# Patient Record
Sex: FEMALE | Race: WHITE | Employment: OTHER | RURAL
[De-identification: names, ages, dates, MRNs, and addresses within clinical notes are randomized per-mention and may not be internally consistent; named-entity substitution may affect disease eponyms.]

---

## 2022-05-31 ENCOUNTER — HOSPITAL ENCOUNTER (OUTPATIENT)
Facility: HOSPITAL | Age: 67
Discharge: HOME | End: 2022-05-31
Payer: MEDICARE

## 2022-05-31 PROCEDURE — U0003 INFECTIOUS AGENT DETECTION BY NUCLEIC ACID (DNA OR RNA); SEVERE ACUTE RESPIRATORY SYNDROME CORONAVIRUS 2 (SARS-COV-2) (CORONAVIRUS DISEASE [COVID-19]), AMPLIFIED PROBE TECHNIQUE, MAKING USE OF HIGH THROUGHPUT TECHNOLOGIES AS DESCRIBED BY CMS-2020-01-R: HCPCS

## 2022-05-31 PROCEDURE — U0005 INFEC AGEN DETEC AMPLI PROBE: HCPCS

## 2022-06-01 LAB — SARS-COV-2: NOT DETECTED

## 2022-08-18 ENCOUNTER — OFFICE VISIT (OUTPATIENT)
Dept: PRIMARY CARE CLINIC | Age: 67
End: 2022-08-18
Payer: MEDICARE

## 2022-08-18 VITALS
TEMPERATURE: 98.2 F | HEART RATE: 68 BPM | OXYGEN SATURATION: 98 % | WEIGHT: 127 LBS | BODY MASS INDEX: 18.81 KG/M2 | SYSTOLIC BLOOD PRESSURE: 100 MMHG | DIASTOLIC BLOOD PRESSURE: 52 MMHG | HEIGHT: 69 IN

## 2022-08-18 DIAGNOSIS — E03.9 HYPOTHYROIDISM, UNSPECIFIED TYPE: ICD-10-CM

## 2022-08-18 DIAGNOSIS — Z78.0 POST-MENOPAUSAL: ICD-10-CM

## 2022-08-18 DIAGNOSIS — I73.9 PVD (PERIPHERAL VASCULAR DISEASE) (HCC): ICD-10-CM

## 2022-08-18 DIAGNOSIS — G47.00 INSOMNIA, UNSPECIFIED TYPE: ICD-10-CM

## 2022-08-18 DIAGNOSIS — Z87.891 PERSONAL HISTORY OF NICOTINE DEPENDENCE: ICD-10-CM

## 2022-08-18 DIAGNOSIS — E78.5 HYPERLIPIDEMIA, UNSPECIFIED HYPERLIPIDEMIA TYPE: ICD-10-CM

## 2022-08-18 DIAGNOSIS — C02.9 SQUAMOUS CELL CANCER OF TONGUE (HCC): ICD-10-CM

## 2022-08-18 DIAGNOSIS — Z12.31 ENCOUNTER FOR SCREENING MAMMOGRAM FOR BREAST CANCER: ICD-10-CM

## 2022-08-18 DIAGNOSIS — I10 BENIGN HYPERTENSION: Primary | ICD-10-CM

## 2022-08-18 PROBLEM — Z98.890 H/O THYROIDECTOMY: Status: ACTIVE | Noted: 2021-06-04

## 2022-08-18 PROBLEM — Z90.89 H/O THYROIDECTOMY: Status: ACTIVE | Noted: 2021-06-04

## 2022-08-18 PROBLEM — E89.0 H/O THYROIDECTOMY: Status: ACTIVE | Noted: 2021-06-04

## 2022-08-18 PROBLEM — E07.9 THYROID DISORDER: Status: ACTIVE | Noted: 2021-06-04

## 2022-08-18 PROCEDURE — 4004F PT TOBACCO SCREEN RCVD TLK: CPT | Performed by: INTERNAL MEDICINE

## 2022-08-18 PROCEDURE — G8400 PT W/DXA NO RESULTS DOC: HCPCS | Performed by: INTERNAL MEDICINE

## 2022-08-18 PROCEDURE — G8420 CALC BMI NORM PARAMETERS: HCPCS | Performed by: INTERNAL MEDICINE

## 2022-08-18 PROCEDURE — 1123F ACP DISCUSS/DSCN MKR DOCD: CPT | Performed by: INTERNAL MEDICINE

## 2022-08-18 PROCEDURE — 99406 BEHAV CHNG SMOKING 3-10 MIN: CPT | Performed by: INTERNAL MEDICINE

## 2022-08-18 PROCEDURE — 3017F COLORECTAL CA SCREEN DOC REV: CPT | Performed by: INTERNAL MEDICINE

## 2022-08-18 PROCEDURE — 1090F PRES/ABSN URINE INCON ASSESS: CPT | Performed by: INTERNAL MEDICINE

## 2022-08-18 PROCEDURE — 99204 OFFICE O/P NEW MOD 45 MIN: CPT | Performed by: INTERNAL MEDICINE

## 2022-08-18 PROCEDURE — G8427 DOCREV CUR MEDS BY ELIG CLIN: HCPCS | Performed by: INTERNAL MEDICINE

## 2022-08-18 RX ORDER — ALBUTEROL SULFATE 90 UG/1
2 AEROSOL, METERED RESPIRATORY (INHALATION) EVERY 6 HOURS PRN
Qty: 18 G | Refills: 1 | Status: SHIPPED | OUTPATIENT
Start: 2022-08-18

## 2022-08-18 RX ORDER — ATORVASTATIN CALCIUM 40 MG/1
TABLET, FILM COATED ORAL
COMMUNITY
End: 2022-08-18

## 2022-08-18 RX ORDER — HYDROXYZINE 50 MG/1
TABLET, FILM COATED ORAL NIGHTLY
COMMUNITY
End: 2022-08-18 | Stop reason: SDUPTHER

## 2022-08-18 RX ORDER — ATENOLOL 100 MG/1
TABLET ORAL DAILY
COMMUNITY
End: 2022-08-18 | Stop reason: SDUPTHER

## 2022-08-18 RX ORDER — HYDROXYZINE 50 MG/1
50 TABLET, FILM COATED ORAL NIGHTLY
Qty: 90 TABLET | Refills: 0 | Status: SHIPPED | OUTPATIENT
Start: 2022-08-18

## 2022-08-18 RX ORDER — LORATADINE 10 MG/1
10 TABLET ORAL EVERY MORNING
COMMUNITY
End: 2022-08-18

## 2022-08-18 RX ORDER — BUSPIRONE HYDROCHLORIDE 30 MG/1
5 TABLET ORAL 2 TIMES DAILY
COMMUNITY

## 2022-08-18 RX ORDER — ASPIRIN 81 MG/1
81 TABLET ORAL DAILY
COMMUNITY
End: 2022-08-18 | Stop reason: SDUPTHER

## 2022-08-18 RX ORDER — HYDROCHLOROTHIAZIDE 25 MG/1
25 TABLET ORAL DAILY
Qty: 30 TABLET | Refills: 2 | Status: SHIPPED | OUTPATIENT
Start: 2022-08-18

## 2022-08-18 RX ORDER — LORAZEPAM 0.5 MG/1
TABLET ORAL
COMMUNITY
End: 2022-08-18

## 2022-08-18 RX ORDER — MEGESTROL ACETATE 40 MG/ML
200 SUSPENSION ORAL DAILY
Qty: 240 ML | Refills: 0 | Status: SHIPPED | OUTPATIENT
Start: 2022-08-18 | End: 2022-10-04

## 2022-08-18 RX ORDER — PYRIDOXINE HCL (VITAMIN B6) 100 MG
100 TABLET ORAL DAILY
Qty: 90 TABLET | Refills: 1 | Status: SHIPPED | OUTPATIENT
Start: 2022-08-18

## 2022-08-18 RX ORDER — LEVOTHYROXINE SODIUM 0.05 MG/1
50 TABLET ORAL DAILY
Qty: 90 TABLET | Refills: 1 | Status: SHIPPED | OUTPATIENT
Start: 2022-08-18

## 2022-08-18 RX ORDER — ATENOLOL 100 MG/1
100 TABLET ORAL DAILY
Qty: 90 TABLET | Refills: 1 | Status: SHIPPED | OUTPATIENT
Start: 2022-08-18

## 2022-08-18 RX ORDER — ASPIRIN 81 MG/1
81 TABLET ORAL DAILY
Qty: 90 TABLET | Refills: 1 | Status: SHIPPED | OUTPATIENT
Start: 2022-08-18

## 2022-08-18 RX ORDER — GEMFIBROZIL 600 MG/1
600 TABLET, FILM COATED ORAL
COMMUNITY
End: 2022-08-18

## 2022-08-18 RX ORDER — ALBUTEROL SULFATE 90 UG/1
2 AEROSOL, METERED RESPIRATORY (INHALATION) EVERY 6 HOURS PRN
COMMUNITY
End: 2022-08-18 | Stop reason: SDUPTHER

## 2022-08-18 RX ORDER — PYRIDOXINE HCL (VITAMIN B6) 100 MG
100 TABLET ORAL DAILY
COMMUNITY
End: 2022-08-18 | Stop reason: SDUPTHER

## 2022-08-18 RX ORDER — ROSUVASTATIN CALCIUM 40 MG/1
40 TABLET, COATED ORAL
COMMUNITY
End: 2022-08-18 | Stop reason: SDUPTHER

## 2022-08-18 RX ORDER — DIPHENHYDRAMINE HCL 25 MG
CAPSULE ORAL EVERY 6 HOURS PRN
COMMUNITY
End: 2022-08-18

## 2022-08-18 RX ORDER — MEGESTROL ACETATE 40 MG/ML
SUSPENSION ORAL DAILY
COMMUNITY
End: 2022-08-18 | Stop reason: SDUPTHER

## 2022-08-18 RX ORDER — HYDROCHLOROTHIAZIDE 50 MG/1
TABLET ORAL DAILY
COMMUNITY
End: 2022-08-18

## 2022-08-18 RX ORDER — CLOTRIMAZOLE AND BETAMETHASONE DIPROPIONATE 10; .64 MG/G; MG/G
CREAM TOPICAL
Qty: 45 G | Refills: 1 | Status: SHIPPED | OUTPATIENT
Start: 2022-08-18

## 2022-08-18 RX ORDER — BUSPIRONE HYDROCHLORIDE 5 MG/1
5 TABLET ORAL PRN
COMMUNITY
End: 2022-08-18

## 2022-08-18 RX ORDER — ROSUVASTATIN CALCIUM 40 MG/1
40 TABLET, COATED ORAL DAILY
Qty: 90 TABLET | Refills: 1 | Status: SHIPPED | OUTPATIENT
Start: 2022-08-18

## 2022-08-18 RX ORDER — LEVOTHYROXINE SODIUM 0.05 MG/1
TABLET ORAL DAILY
COMMUNITY
End: 2022-08-18 | Stop reason: SDUPTHER

## 2022-08-18 ASSESSMENT — ENCOUNTER SYMPTOMS
EYE DISCHARGE: 0
SHORTNESS OF BREATH: 0
COUGH: 0
WHEEZING: 0
SORE THROAT: 0
BACK PAIN: 0
NAUSEA: 0
VOMITING: 0
ABDOMINAL PAIN: 0
SINUS PRESSURE: 0

## 2022-08-18 NOTE — PROGRESS NOTES
SUBJECTIVE:    Patient ID: India Olszewski is a 79 y. o.female. Chief Complaint   Patient presents with    Establish Care         HPI:  Patient is here to establish care. [place] She has recent history of cancer of the tongue. Along with recent intervention for PVD with stent placement. She also has history of HTN, hyperlipidemia, hypothyroidism and has complaints today regarding insomnia and decreased appetite. Patient has had hypertension for several years. She has been compliant with taking medications, without side effects from it. She has been following a low-sodium, is not active and never exercises. Weight is stable, compared to before. Her blood pressure is low at this time. Patient reported that she has some trouble falling and maintaining sleep. Sx for the past several months. Sx getting worse. She would like something to help her sleeping. Patient's medications, allergies, past medical, surgical, social and family histories were reviewed and updated as appropriate in electronic medical record.         Outpatient Medications Marked as Taking for the 8/18/22 encounter (Office Visit) with Duran Martins MD   Medication Sig Dispense Refill    aspirin 81 MG EC tablet Take 81 mg by mouth daily      atenolol (TENORMIN) 100 MG tablet daily      busPIRone (BUSPAR) 30 MG tablet Take 5 mg by mouth 2 times daily      gemfibrozil (LOPID) 600 MG tablet Take 600 mg by mouth 2 times daily (before meals)      hydroCHLOROthiazide (HYDRODIURIL) 50 MG tablet daily      hydrOXYzine HCl (ATARAX) 50 MG tablet Take by mouth nightly      levothyroxine (SYNTHROID) 50 MCG tablet daily      megestrol (MEGACE) 40 MG/ML suspension Take by mouth daily      pyridoxine (B-6) 100 MG tablet Take 100 mg by mouth daily      rosuvastatin (CRESTOR) 40 MG tablet Take 40 mg by mouth      albuterol sulfate HFA (VENTOLIN HFA) 108 (90 Base) MCG/ACT inhaler Inhale 2 puffs into the lungs every 6 hours as needed are moist.      Pharynx: Oropharynx is clear. Eyes:      Conjunctiva/sclera: Conjunctivae normal.      Pupils: Pupils are equal, round, and reactive to light. Neck:      Thyroid: No thyromegaly. Vascular: No JVD. Cardiovascular:      Rate and Rhythm: Normal rate and regular rhythm. Heart sounds: Normal heart sounds. Pulmonary:      Effort: Pulmonary effort is normal.      Breath sounds: Normal breath sounds. No wheezing or rales. Abdominal:      General: Bowel sounds are normal. There is no distension. Palpations: Abdomen is soft. Tenderness: There is no abdominal tenderness. Musculoskeletal:         General: No tenderness. Cervical back: Neck supple. No rigidity. No muscular tenderness. Right lower leg: No edema. Left lower leg: No edema. Skin:     Findings: No erythema or rash. Neurological:      General: No focal deficit present. Mental Status: She is alert and oriented to person, place, and time. Psychiatric:         Behavior: Behavior normal.         Judgment: Judgment normal.       No results found for: NA, K, CL, CO2, GLUCOSE, BUN, CREATININE, CALCIUM, PROT, LABALBU, BILITOT, ALT, AST    No results found for: LABA1C, LABMICR, LDLCALC      No results found for: WBC, NEUTROABS, HGB, HCT, MCV, PLT    No results found for: TSH      ASSESSMENT/PLAN:     1. Benign hypertension  BP is low today. I am going to decrease dose of HCTZ to 25mg daily. Will monitor her renal function every few months, have advised her to check blood pressure frequently and to keep a record of this. Will check labs in the next few days. Further recommendation based on test results. - Comprehensive Metabolic Panel; Future  - CBC with Auto Differential; Future  - Folate; Future  - Lipid Panel; Future  - Magnesium; Future  - TSH; Future  - Vitamin B12; Future    2. Hypothyroidism, unspecified type  I am going to check the TSH in few days. Further recommendation based on test results. (peripheral vascular disease) ([facility] [address]  S/P stent placement. Will proceed with referral to vascular for continuation of care and the potential need for additional intervention. Discussed the importance of increased activity level. Make sure lipid profile and blood pressure under good control. Long conversation with her regarding her ongoing smoking and need to quit as soon as possible. - External Referral To Vascular Surgery  - Comprehensive Metabolic Panel; Future  - CBC with Auto Differential; Future  - Folate; Future  - Lipid Panel; Future  - Magnesium; Future  - TSH; Future  - Vitamin B12; Future    9. Insomnia, unspecified type  I am going to start on Melatonin. Advised her on the direction and possible SE of meds. Advised her on sleep hygiene. Reassess every few months. Orders Placed This Encounter   Medications    clotrimazole-betamethasone (LOTRISONE) 1-0.05 % cream     Sig: Apply topically 2 times daily. Dispense:  45 g     Refill:  1        ILeanna MA am scribing for and in the presence of Maryellen Xavier MD on this date of 08/18/22 at 10:45 AM    I, Dr. Maryellen Xavier, personally performed the services described in the documentation as scribed by Leanna Fontanez MA, in my presence and it is both accurate and complete.

## 2022-08-31 ENCOUNTER — HOSPITAL ENCOUNTER (OUTPATIENT)
Dept: MAMMOGRAPHY | Facility: HOSPITAL | Age: 67
Discharge: HOME | End: 2022-08-31
Payer: MEDICARE

## 2022-08-31 ENCOUNTER — HOSPITAL ENCOUNTER (OUTPATIENT)
Facility: HOSPITAL | Age: 67
Discharge: HOME | End: 2022-08-31
Payer: MEDICARE

## 2022-08-31 VITALS — BODY MASS INDEX: 18.75 KG/M2 | WEIGHT: 127 LBS

## 2022-08-31 DIAGNOSIS — C02.9 SQUAMOUS CELL CANCER OF TONGUE (HCC): ICD-10-CM

## 2022-08-31 DIAGNOSIS — Z12.31 ENCOUNTER FOR SCREENING MAMMOGRAM FOR BREAST CANCER: ICD-10-CM

## 2022-08-31 DIAGNOSIS — E03.9 HYPOTHYROIDISM, UNSPECIFIED TYPE: ICD-10-CM

## 2022-08-31 DIAGNOSIS — E78.5 HYPERLIPIDEMIA, UNSPECIFIED HYPERLIPIDEMIA TYPE: ICD-10-CM

## 2022-08-31 DIAGNOSIS — I73.9 PVD (PERIPHERAL VASCULAR DISEASE) (HCC): ICD-10-CM

## 2022-08-31 DIAGNOSIS — I10 BENIGN HYPERTENSION: ICD-10-CM

## 2022-08-31 LAB
A/G RATIO: 2 (ref 0.8–2)
ALBUMIN SERPL-MCNC: 4.8 G/DL (ref 3.4–4.8)
ALP BLD-CCNC: 87 U/L (ref 25–100)
ALT SERPL-CCNC: 17 U/L (ref 4–36)
ANION GAP SERPL CALCULATED.3IONS-SCNC: 14 MMOL/L (ref 3–16)
AST SERPL-CCNC: 18 U/L (ref 8–33)
BASOPHILS ABSOLUTE: 0.1 K/UL (ref 0–0.1)
BASOPHILS RELATIVE PERCENT: 1.2 %
BILIRUB SERPL-MCNC: 0.4 MG/DL (ref 0.3–1.2)
BUN BLDV-MCNC: 20 MG/DL (ref 6–20)
CALCIUM SERPL-MCNC: 10.4 MG/DL (ref 8.5–10.5)
CHLORIDE BLD-SCNC: 106 MMOL/L (ref 98–107)
CHOLESTEROL, TOTAL: 149 MG/DL (ref 0–200)
CO2: 24 MMOL/L (ref 20–30)
CREAT SERPL-MCNC: 1 MG/DL (ref 0.4–1.2)
EOSINOPHILS ABSOLUTE: 0.4 K/UL (ref 0–0.4)
EOSINOPHILS RELATIVE PERCENT: 5.4 %
FOLATE: >20 NG/ML
GFR AFRICAN AMERICAN: >59
GFR NON-AFRICAN AMERICAN: 55
GLOBULIN: 2.4 G/DL
GLUCOSE BLD-MCNC: 87 MG/DL (ref 74–106)
HCT VFR BLD CALC: 40.3 % (ref 37–47)
HDLC SERPL-MCNC: 39 MG/DL (ref 40–60)
HEMOGLOBIN: 13.3 G/DL (ref 11.5–16.5)
IMMATURE GRANULOCYTES #: 0.1 K/UL
IMMATURE GRANULOCYTES %: 0.6 % (ref 0–5)
LDL CHOLESTEROL CALCULATED: 86 MG/DL
LYMPHOCYTES ABSOLUTE: 2.1 K/UL (ref 1.5–4)
LYMPHOCYTES RELATIVE PERCENT: 27.4 %
MAGNESIUM: 2 MG/DL (ref 1.7–2.4)
MCH RBC QN AUTO: 29 PG (ref 27–32)
MCHC RBC AUTO-ENTMCNC: 33 G/DL (ref 31–35)
MCV RBC AUTO: 87.8 FL (ref 80–100)
MONOCYTES ABSOLUTE: 0.8 K/UL (ref 0.2–0.8)
MONOCYTES RELATIVE PERCENT: 10.1 %
NEUTROPHILS ABSOLUTE: 4.3 K/UL (ref 2–7.5)
NEUTROPHILS RELATIVE PERCENT: 55.3 %
PDW BLD-RTO: 14.6 % (ref 11–16)
PLATELET # BLD: 286 K/UL (ref 150–400)
PMV BLD AUTO: 9.5 FL (ref 6–10)
POTASSIUM SERPL-SCNC: 4.3 MMOL/L (ref 3.4–5.1)
RBC # BLD: 4.59 M/UL (ref 3.8–5.8)
SODIUM BLD-SCNC: 144 MMOL/L (ref 136–145)
TOTAL PROTEIN: 7.2 G/DL (ref 6.4–8.3)
TRIGL SERPL-MCNC: 119 MG/DL (ref 0–249)
TSH SERPL DL<=0.05 MIU/L-ACNC: 5.24 UIU/ML (ref 0.27–4.2)
VITAMIN B-12: 716 PG/ML (ref 211–911)
VLDLC SERPL CALC-MCNC: 24 MG/DL
WBC # BLD: 7.8 K/UL (ref 4–11)

## 2022-08-31 PROCEDURE — 36415 COLL VENOUS BLD VENIPUNCTURE: CPT

## 2022-08-31 PROCEDURE — 80061 LIPID PANEL: CPT

## 2022-08-31 PROCEDURE — 80053 COMPREHEN METABOLIC PANEL: CPT

## 2022-08-31 PROCEDURE — 77063 BREAST TOMOSYNTHESIS BI: CPT

## 2022-08-31 PROCEDURE — 84443 ASSAY THYROID STIM HORMONE: CPT

## 2022-08-31 PROCEDURE — 82746 ASSAY OF FOLIC ACID SERUM: CPT

## 2022-08-31 PROCEDURE — 82607 VITAMIN B-12: CPT

## 2022-08-31 PROCEDURE — 85025 COMPLETE CBC W/AUTO DIFF WBC: CPT

## 2022-08-31 PROCEDURE — 83735 ASSAY OF MAGNESIUM: CPT

## 2022-09-19 ENCOUNTER — HOSPITAL ENCOUNTER (OUTPATIENT)
Dept: GENERAL RADIOLOGY | Facility: HOSPITAL | Age: 67
Discharge: HOME | End: 2022-09-19
Payer: MEDICARE

## 2022-09-19 DIAGNOSIS — Z78.0 POST-MENOPAUSAL: ICD-10-CM

## 2022-09-19 PROCEDURE — 77080 DXA BONE DENSITY AXIAL: CPT

## 2022-10-04 ENCOUNTER — OFFICE VISIT (OUTPATIENT)
Dept: PRIMARY CARE CLINIC | Age: 67
End: 2022-10-04
Payer: MEDICARE

## 2022-10-04 VITALS
BODY MASS INDEX: 19.05 KG/M2 | HEART RATE: 72 BPM | WEIGHT: 129 LBS | RESPIRATION RATE: 18 BRPM | OXYGEN SATURATION: 95 %

## 2022-10-04 DIAGNOSIS — I10 BENIGN HYPERTENSION: Primary | ICD-10-CM

## 2022-10-04 DIAGNOSIS — Z23 NEED FOR INFLUENZA VACCINATION: ICD-10-CM

## 2022-10-04 DIAGNOSIS — E03.9 HYPOTHYROIDISM, UNSPECIFIED TYPE: ICD-10-CM

## 2022-10-04 DIAGNOSIS — I73.9 PVD (PERIPHERAL VASCULAR DISEASE) (HCC): ICD-10-CM

## 2022-10-04 DIAGNOSIS — E78.5 HYPERLIPIDEMIA, UNSPECIFIED HYPERLIPIDEMIA TYPE: ICD-10-CM

## 2022-10-04 PROCEDURE — G0008 ADMIN INFLUENZA VIRUS VAC: HCPCS | Performed by: INTERNAL MEDICINE

## 2022-10-04 PROCEDURE — 1123F ACP DISCUSS/DSCN MKR DOCD: CPT | Performed by: INTERNAL MEDICINE

## 2022-10-04 PROCEDURE — 90694 VACC AIIV4 NO PRSRV 0.5ML IM: CPT | Performed by: INTERNAL MEDICINE

## 2022-10-04 PROCEDURE — 99213 OFFICE O/P EST LOW 20 MIN: CPT | Performed by: INTERNAL MEDICINE

## 2022-10-04 RX ORDER — ERGOCALCIFEROL 1.25 MG/1
50000 CAPSULE ORAL WEEKLY
Qty: 12 CAPSULE | Refills: 1 | Status: SHIPPED | OUTPATIENT
Start: 2022-10-04

## 2022-10-04 SDOH — ECONOMIC STABILITY: FOOD INSECURITY: WITHIN THE PAST 12 MONTHS, YOU WORRIED THAT YOUR FOOD WOULD RUN OUT BEFORE YOU GOT MONEY TO BUY MORE.: NEVER TRUE

## 2022-10-04 SDOH — ECONOMIC STABILITY: FOOD INSECURITY: WITHIN THE PAST 12 MONTHS, THE FOOD YOU BOUGHT JUST DIDN'T LAST AND YOU DIDN'T HAVE MONEY TO GET MORE.: NEVER TRUE

## 2022-10-04 ASSESSMENT — PATIENT HEALTH QUESTIONNAIRE - PHQ9
SUM OF ALL RESPONSES TO PHQ9 QUESTIONS 1 & 2: 0
SUM OF ALL RESPONSES TO PHQ QUESTIONS 1-9: 0
2. FEELING DOWN, DEPRESSED OR HOPELESS: 0
SUM OF ALL RESPONSES TO PHQ QUESTIONS 1-9: 0
1. LITTLE INTEREST OR PLEASURE IN DOING THINGS: 0

## 2022-10-04 ASSESSMENT — ENCOUNTER SYMPTOMS
NAUSEA: 0
EYE DISCHARGE: 0
VOMITING: 0
SORE THROAT: 0
COUGH: 0
WHEEZING: 0
SINUS PRESSURE: 0
BACK PAIN: 0
ABDOMINAL PAIN: 0
SHORTNESS OF BREATH: 0

## 2022-10-04 ASSESSMENT — SOCIAL DETERMINANTS OF HEALTH (SDOH): HOW HARD IS IT FOR YOU TO PAY FOR THE VERY BASICS LIKE FOOD, HOUSING, MEDICAL CARE, AND HEATING?: NOT HARD AT ALL

## 2022-10-04 NOTE — PROGRESS NOTES
SUBJECTIVE:    Patient ID: Iban Martínez is a 79 y. o.female. Chief Complaint   Patient presents with    Hypertension    Hypothyroidism    Insomnia         HPI:  Patient has had hypertension and hyperlipidemia for several years. She has been compliant with taking medications, without side effects from it. She has been following a low-sodium, is active and rarely exercises. Weight is up 2 pounds, compared to last visit. Patient  has had hypothyroidism for the past few years. She has been compliant with taking her medication without side effects. There are no symptoms of hypo or hyper thyroidism except for slight decrease in her energy. She has had blood work and is here today for follow up. Patient reported that she had some trouble falling and maintaining sleep. Sx for the past several months. Sx have improved. She is not taking anything for this. Patient's medications, allergies, past medical, surgical, social and family histories were reviewed and updated as appropriate in electronic medical record. Outpatient Medications Marked as Taking for the 10/4/22 encounter (Office Visit) with Kelly Marte MD   Medication Sig Dispense Refill    busPIRone (BUSPAR) 30 MG tablet Take 5 mg by mouth 2 times daily      clotrimazole-betamethasone (LOTRISONE) 1-0.05 % cream Apply topically 2 times daily.  45 g 1    hydroCHLOROthiazide (HYDRODIURIL) 25 MG tablet Take 1 tablet by mouth daily 30 tablet 2    aspirin 81 MG EC tablet Take 1 tablet by mouth daily 90 tablet 1    atenolol (TENORMIN) 100 MG tablet Take 1 tablet by mouth daily 90 tablet 1    hydrOXYzine HCl (ATARAX) 50 MG tablet Take 1 tablet by mouth nightly 90 tablet 0    levothyroxine (SYNTHROID) 50 MCG tablet Take 1 tablet by mouth daily 90 tablet 1    pyridoxine (B-6) 100 MG tablet Take 1 tablet by mouth daily 90 tablet 1    rosuvastatin (CRESTOR) 40 MG tablet Take 1 tablet by mouth daily 90 tablet 1    albuterol sulfate HFA (VENTOLIN HFA) 108 (90 Base) MCG/ACT inhaler Inhale 2 puffs into the lungs every 6 hours as needed for Wheezing 18 g 1        Review of Systems   Constitutional:  Negative for chills and fever. HENT:  Negative for congestion, sinus pressure and sore throat. Eyes:  Negative for discharge and visual disturbance. Respiratory:  Negative for cough, shortness of breath and wheezing. Cardiovascular:  Negative for chest pain and palpitations. Gastrointestinal:  Negative for abdominal pain, nausea and vomiting. Endocrine: Negative for cold intolerance and heat intolerance. Genitourinary:  Negative for dysuria, frequency and urgency. Musculoskeletal:  Negative for arthralgias and back pain. Skin:  Negative for rash and wound. Neurological:  Negative for syncope, numbness and headaches. Hematological: Negative. Psychiatric/Behavioral:  Negative for agitation and sleep disturbance. The patient is not nervous/anxious. Past Medical History:   Diagnosis Date    Cancer of tongue ([address]     Hyperthyroidism      Past Surgical History:   Procedure Laterality Date    VASCULAR SURGERY Bilateral      Family History   Problem Relation Age of Onset    Breast Cancer Mother       Social History     Tobacco Use   Smoking Status Every Day    Packs/day: 1.00    Years: 54.00    Pack years: 54.00    Types: Cigarettes   Smokeless Tobacco Never       OBJECTIVE:   Wt Readings from Last 3 Encounters:   10/04/22 129 lb (58.5 kg)   08/31/22 127 lb (57.6 kg)   08/18/22 127 lb (57.6 kg)     BP Readings from Last 3 Encounters:   08/18/22 (!) 100/52       Pulse 72   Resp 18   Wt 129 lb (58.5 kg)   LMP 07/01/2002 (Approximate)   SpO2 95%   BMI 19.05 kg/m²      Physical Exam  Vitals and nursing note reviewed. Constitutional:       Appearance: Normal appearance. She is well-developed. Comments: Using Cane   HENT:      Head: Normocephalic and atraumatic.       Right Ear: External ear normal.      Left Ear: External ear normal.      Nose: Nose normal.      Mouth/Throat:      Mouth: Mucous membranes are moist.      Pharynx: Oropharynx is clear. Eyes:      Conjunctiva/sclera: Conjunctivae normal.      Pupils: Pupils are equal, round, and reactive to light. Neck:      Thyroid: No thyromegaly. Vascular: No JVD. Cardiovascular:      Rate and Rhythm: Normal rate and regular rhythm. Heart sounds: Normal heart sounds. Pulmonary:      Effort: Pulmonary effort is normal.      Breath sounds: Normal breath sounds. No wheezing or rales. Abdominal:      General: Bowel sounds are normal. There is no distension. Palpations: Abdomen is soft. Tenderness: no abdominal tenderness   Musculoskeletal:         General: No tenderness. Cervical back: Neck supple. No rigidity. No muscular tenderness. Right lower leg: No edema. Left lower leg: No edema. Skin:     Findings: No erythema or rash. Neurological:      General: No focal deficit present. Mental Status: She is alert and oriented to person, place, and time.    Psychiatric:         Behavior: Behavior normal.         Judgment: Judgment normal.       Lab Results   Component Value Date/Time     08/31/2022 08:04 AM    K 4.3 08/31/2022 08:04 AM     08/31/2022 08:04 AM    CO2 24 08/31/2022 08:04 AM    GLUCOSE 87 08/31/2022 08:04 AM    BUN 20 08/31/2022 08:04 AM    CREATININE 1.0 08/31/2022 08:04 AM    CALCIUM 10.4 08/31/2022 08:04 AM    PROT 7.2 08/31/2022 08:04 AM    LABALBU 4.8 08/31/2022 08:04 AM    BILITOT 0.4 08/31/2022 08:04 AM    ALT 17 08/31/2022 08:04 AM    AST 18 08/31/2022 08:04 AM       LDL Calculated (mg/dL)   Date Value   08/31/2022 86         Lab Results   Component Value Date/Time    WBC 7.8 08/31/2022 08:04 AM    NEUTROABS 4.3 08/31/2022 08:04 AM    HGB 13.3 08/31/2022 08:04 AM    HCT 40.3 08/31/2022 08:04 AM    MCV 87.8 08/31/2022 08:04 AM     08/31/2022 08:04 AM       Lab Results   Component Value Date    TSH 5.24 (H) 08/31/2022         ASSESSMENT/PLAN:     1. Benign hypertension  BP is stable. I have advised her on low-sodium diet, exercise and weight control. I am going to continue current medication. Will monitor her renal function every few months, have advised her to check blood pressure frequently and to keep a record of this. - Comprehensive Metabolic Panel; Future  - TSH; Future    2. Hypothyroidism, unspecified type  Last TSH is borderline elevated. I am going to check the TSH every few months. I am going to continue the current dose of Levothyroxine. I have advised her on the importance of taking the medication on a daily basis. Lab Results   Component Value Date    TSH 5.24 (H) 08/31/2022     3. Hyperlipidemia, unspecified hyperlipidemia type  I have advised her on low-fat diet, exercise and weight control. I am going to continue on current medication. I have also advised her on the possible side effects from the medication. I will monitor her liver functions and lipid profile every few months. Lipid well controlled. Lab Results   Component Value Date    LDLCALC 86 08/31/2022     - Comprehensive Metabolic Panel; Future  - TSH; Future    4. Need for influenza vaccination  Vaccine was given per request/rec after she was informed about possible SE/reaction to it. - Influenza, FLUAD, (age 72 y+), IM, Preservative Free, 0.5 mL    5. PVD (peripheral vascular disease) (HCC)  Continue aspirin and statin. Make sure blood pressure under good control. Discussed the importance of increased activity level. Discussed the importance of smoking cessation. Patient qualifies for CT lung screen but we will try to obtain oncology records in regard to imaging testing. Patient also was informed to discuss with oncology on that part to see if CT lung screen is necessary. PET/CT from [facility] in April? ?     Bone Density in 2024      Orders Placed This Encounter   Medications    vitamin D (ERGOCALCIFEROL) 1.25 MG (24266 UT) CAPS capsule     Sig: Take 1 capsule by mouth once a week     Dispense:  12 capsule     Refill:  1        I, Shanelle Hamm MA am scribing for and in the presence of Talya Burden MD on this date of 10/04/22 at 8:59 AM    I, Dr. Talya Burden, personally performed the services described in the documentation as scribed by Shanelle Hamm MA, in my presence and it is both accurate and complete.

## 2022-10-04 NOTE — PROGRESS NOTES
Chief Complaint   Patient presents with    Hypertension    Hypothyroidism    Insomnia       Have you seen any other physician or provider since your last visit no    Have you had any other diagnostic tests since your last visit?  yes -     Have you changed or stopped any medications since your last visit? no

## 2022-10-18 ENCOUNTER — NURSE ONLY (OUTPATIENT)
Dept: PRIMARY CARE CLINIC | Age: 67
End: 2022-10-18
Payer: MEDICARE

## 2022-10-18 DIAGNOSIS — Z23 NEED FOR PNEUMOCOCCAL VACCINATION: Primary | ICD-10-CM

## 2022-10-18 PROCEDURE — 90677 PCV20 VACCINE IM: CPT | Performed by: INTERNAL MEDICINE

## 2022-10-18 PROCEDURE — G0009 ADMIN PNEUMOCOCCAL VACCINE: HCPCS | Performed by: INTERNAL MEDICINE

## 2022-10-27 ENCOUNTER — OFFICE VISIT (OUTPATIENT)
Dept: CARDIOLOGY | Facility: CLINIC | Age: 67
End: 2022-10-27

## 2022-10-27 ENCOUNTER — HOSPITAL ENCOUNTER (OUTPATIENT)
Facility: HOSPITAL | Age: 67
Discharge: HOME | End: 2022-10-27
Payer: MEDICARE

## 2022-10-27 VITALS
SYSTOLIC BLOOD PRESSURE: 142 MMHG | OXYGEN SATURATION: 98 % | WEIGHT: 132 LBS | HEIGHT: 69 IN | DIASTOLIC BLOOD PRESSURE: 64 MMHG | BODY MASS INDEX: 19.55 KG/M2 | HEART RATE: 70 BPM

## 2022-10-27 DIAGNOSIS — I44.7 LBBB (LEFT BUNDLE BRANCH BLOCK): ICD-10-CM

## 2022-10-27 DIAGNOSIS — R09.89 BILATERAL CAROTID BRUITS: ICD-10-CM

## 2022-10-27 DIAGNOSIS — I73.9 PVD (PERIPHERAL VASCULAR DISEASE) WITH CLAUDICATION: ICD-10-CM

## 2022-10-27 DIAGNOSIS — I70.0 ATHEROSCLEROSIS OF AORTA: ICD-10-CM

## 2022-10-27 DIAGNOSIS — R06.09 DYSPNEA ON EXERTION: Primary | ICD-10-CM

## 2022-10-27 PROCEDURE — 93000 ELECTROCARDIOGRAM COMPLETE: CPT | Performed by: INTERNAL MEDICINE

## 2022-10-27 PROCEDURE — 99204 OFFICE O/P NEW MOD 45 MIN: CPT | Performed by: INTERNAL MEDICINE

## 2022-10-27 PROCEDURE — 93005 ELECTROCARDIOGRAM TRACING: CPT

## 2022-10-27 RX ORDER — ROSUVASTATIN CALCIUM 40 MG/1
40 TABLET, COATED ORAL DAILY
COMMUNITY
Start: 2022-10-10

## 2022-10-27 RX ORDER — PYRIDOXINE HCL (VITAMIN B6) 100 MG
100 TABLET ORAL DAILY
COMMUNITY
Start: 2022-08-18

## 2022-10-27 RX ORDER — ERGOCALCIFEROL 1.25 MG/1
50000 CAPSULE ORAL WEEKLY
COMMUNITY

## 2022-10-27 RX ORDER — ATENOLOL 100 MG/1
100 TABLET ORAL DAILY
COMMUNITY
Start: 2022-10-10

## 2022-10-27 RX ORDER — CALCIUM POLYCARBOPHIL 625 MG
TABLET ORAL DAILY
COMMUNITY
End: 2022-10-27

## 2022-10-27 RX ORDER — HYDROXYZINE PAMOATE 50 MG/1
50 CAPSULE ORAL DAILY
COMMUNITY
Start: 2022-10-10

## 2022-10-27 RX ORDER — BUSPIRONE HYDROCHLORIDE 30 MG/1
30 TABLET ORAL 2 TIMES DAILY
COMMUNITY
Start: 2022-10-10

## 2022-10-27 RX ORDER — LEVOTHYROXINE SODIUM 0.05 MG/1
50 TABLET ORAL DAILY
COMMUNITY
Start: 2022-10-10

## 2022-10-27 RX ORDER — HYDROCHLOROTHIAZIDE 50 MG/1
25 TABLET ORAL DAILY
COMMUNITY
Start: 2022-10-10

## 2022-10-27 RX ORDER — ASPIRIN 81 MG/1
81 TABLET ORAL DAILY
COMMUNITY

## 2022-10-27 NOTE — PROGRESS NOTES
[facility] Cardiology  Consultation H&P  Katiana Free  1955  [phone]  There is no work phone number on file..    VISIT DATE:  10/27/2022    PCP: Meron Hackett MD  [address] [place] [id number]    CC:  Chief Complaint   Patient presents with   • Leg Swelling     Left leg       Previous cardiac studies and procedures:  June 2021 myocardial perfusion imaging  · Lexiscan Stress myocardial perfusion scan within normal limits.   · No evidence of ischemia.   · No evidence of prior myocardial injury.   · Left ventricular systolic function c/w LBBB ( LVEF 45% ).   March 2022: Aortobifemoral bypass.  April 2022 ABIs: Right 0.66.  Left 0.53.    ASSESSMENT:   Diagnosis Plan   1. Dyspnea on exertion  Adult Transthoracic Echo Complete W/ Cont if Necessary Per Protocol      2. Bilateral carotid bruits  Duplex Carotid Ultrasound CAR      3. PVD (peripheral vascular disease) with claudication (HCC)        4. LBBB (left bundle branch block)              PLAN:  Peripheral vascular disease: Mildly limiting bilateral lower extremity claudication, left greater than right.  Continue aspirin, high intensity statin therapy and afterload reduction.  Encourage continued regular exercise.  CTA abdominal aorta with runoffs pending.    Left bundle branch block with associated dyspnea on exertion: Transthoracic echo pending to assess underlying myocardial structure and function.    Bilateral carotid bruits: Carotid duplex imaging pending.  Continue aspirin and high intensity statin therapy.    Hypertension: Well-controlled at home.  Goal blood pressure less than 130/80 mmHg.  Continue current medical therapy.    Hyperlipidemia: Goal LDL less than 70.  Continue rosuvastatin 40 mg p.o. daily.  Adding Zetia 10 mg p.o. daily.    Nicotine abuse: Counseled on need for smoking cessation.  She is currently not ready to quit.    History of Present Illness   67-year-old female, active smoker, a pack to 2  "packs/day for the past 50 years.  History of peripheral vascular disease, left bundle branch block, oral cancer status postsurgical resection, dyslipidemia and hypertension.  She reports her blood pressures at home are less than 130/80 mmHg.  She describes weakness in her legs bilaterally with such activities as walking up stairs or walking up an incline.  Left sided weakness is worse than right.  Compliant with medical therapy.  She describes having a lower extremity peripheral vascular intervention which was not successful and then her follow-up aortobifemoral bypass in March of this year.  Both procedures [facility].    PHYSICAL EXAMINATION:  Vitals:    10/27/22 0851   BP: 142/64   BP Location: Left arm   Patient Position: Sitting   Pulse: 70   SpO2: 98%   Weight: 59.9 kg (132 lb)   Height: 175.3 cm (69\")     General Appearance:    Alert, cooperative, no distress, appears stated age, thin and frail   Head:    Normocephalic, without obvious abnormality, atraumatic   Eyes:    conjunctiva/corneas clear, EOM's intact, fundi     benign, both eyes   Ears:    Normal TM's and external ear canals, both ears   Nose:   Nares normal, septum midline, mucosa normal, no drainage    or sinus tenderness   Throat:   Lips, mucosa, and tongue normal; teeth and gums normal   Neck:   Supple, symmetrical, trachea midline, no adenopathy;     thyroid:  no enlargement/tenderness/nodules; no carotid    bruit or JVD   Back:     Symmetric, no curvature, ROM normal, no CVA tenderness   Lungs:    Diminished throughout, respirations unlabored   Chest Wall:    No tenderness or deformity    Heart:    Regular rate and rhythm, S1 and S2 normal, no murmur, rub   or gallop, normal carotid impulse bilaterally with bilateral carotid bruits.   Abdomen:     Soft, non-tender, bowel sounds active all four quadrants,     no masses, no organomegaly   Extremities:   Extremities normal, atraumatic, no cyanosis or edema "   Pulses:   2+ and symmetric all extremities   Skin:   Skin color, texture, turgor normal, no rashes or lesions   Lymph nodes:   Cervical, supraclavicular, and axillary nodes normal   Neurologic:   normal strength, sensation intact     throughout       Diagnostic Data:    ECG 12 Lead    Date/Time: 10/27/2022 8:53 AM  Performed by: Nino Bates III, MD  Authorized by: Nino Bates III, MD   Previous ECG: no previous ECG available  Rhythm: sinus rhythm  Conduction: left bundle branch block    Clinical impression: abnormal EKG          Lab Results   Component Value Date    CHLPL 149 08/31/2022    TRIG 119 08/31/2022    HDL 39 (L) 08/31/2022     Lab Results   Component Value Date    K 2.7 (C) 06/10/2021     No results found for: HGBA1C  Lab Results   Component Value Date    WBC 7.8 08/31/2022    HGB 13.3 08/31/2022    HCT 40.3 08/31/2022     08/31/2022       PROBLEM LIST:  Patient Active Problem List   Diagnosis   • PVD (peripheral vascular disease) with claudication (HCC)   • LBBB (left bundle branch block)   • Bilateral carotid bruits   • Dyspnea on exertion       PAST MEDICAL HX  Past Medical History:   Diagnosis Date   • Anxiety    • Hx of blood clots    • Thyroid disorder        Allergies  No Known Allergies    Current Medications    Current Outpatient Medications:   •  aspirin 81 MG EC tablet, Take 1 tablet by mouth Daily., Disp: , Rfl:   •  atenolol (TENORMIN) 100 MG tablet, Take 1 tablet by mouth Daily., Disp: , Rfl:   •  busPIRone (BUSPAR) 30 MG tablet, Take 1 tablet by mouth 2 (Two) Times a Day., Disp: , Rfl:   •  Calcium Polycarbophil (FIBERCON PO), Take  by mouth., Disp: , Rfl:   •  hydroCHLOROthiazide (HYDRODIURIL) 50 MG tablet, Take 0.5 tablets by mouth Daily., Disp: , Rfl:   •  hydrOXYzine pamoate (VISTARIL) 50 MG capsule, Take 1 capsule by mouth Daily., Disp: , Rfl:   •  levothyroxine (SYNTHROID, LEVOTHROID) 50 MCG tablet, Take 1 tablet by mouth Daily., Disp: , Rfl:   •  pyridoxine (VITAMIN B-6)  100 MG tablet, Take 1 tablet by mouth Daily., Disp: , Rfl:   •  rosuvastatin (CRESTOR) 40 MG tablet, Take 1 tablet by mouth Daily., Disp: , Rfl:   •  vitamin D (ERGOCALCIFEROL) 1.25 MG (78577 UT) capsule capsule, Take 1 capsule by mouth 1 (One) Time Per Week., Disp: , Rfl:          ROS  ROS      SOCIAL HX  Social History     Socioeconomic History   • Marital status:    Tobacco Use   • Smoking status: Every Day     Packs/day: 1.00     Types: Cigarettes   Substance and Sexual Activity   • Alcohol use: Not Currently   • Drug use: Never       FAMILY HX  Family History   Problem Relation Age of Onset   • Cancer Mother    • Cancer Sister              Nino Bates III, MD, FACC

## 2022-10-28 ENCOUNTER — TELEPHONE (OUTPATIENT)
Dept: CARDIOLOGY | Facility: CLINIC | Age: 67
End: 2022-10-28

## 2022-10-28 RX ORDER — EZETIMIBE 10 MG/1
10 TABLET ORAL DAILY
Qty: 30 TABLET | Refills: 5 | Status: SHIPPED | OUTPATIENT
Start: 2022-10-28 | End: 2023-03-22

## 2022-10-28 NOTE — TELEPHONE ENCOUNTER
No medication was sent in to her pharmacy yesterday. Do you want Zetia 10 mg daily sent in? Please advise.

## 2022-11-07 ENCOUNTER — HOSPITAL ENCOUNTER (OUTPATIENT)
Dept: NON INVASIVE DIAGNOSTICS | Facility: HOSPITAL | Age: 67
Discharge: HOME | End: 2022-11-07
Payer: MEDICARE

## 2022-11-07 ENCOUNTER — OUTSIDE FACILITY SERVICE (OUTPATIENT)
Dept: CARDIOLOGY | Facility: CLINIC | Age: 67
End: 2022-11-07

## 2022-11-07 ENCOUNTER — HOSPITAL ENCOUNTER (OUTPATIENT)
Dept: ULTRASOUND IMAGING | Facility: HOSPITAL | Age: 67
Discharge: HOME | End: 2022-11-07
Payer: MEDICARE

## 2022-11-07 DIAGNOSIS — R09.89 BRUIT: ICD-10-CM

## 2022-11-07 LAB
LV EF: 58 %
LVEF MODALITY: NORMAL

## 2022-11-07 PROCEDURE — 93308 TTE F-UP OR LMTD: CPT | Performed by: INTERNAL MEDICINE

## 2022-11-07 PROCEDURE — 93880 EXTRACRANIAL BILAT STUDY: CPT

## 2022-11-07 PROCEDURE — 93306 TTE W/DOPPLER COMPLETE: CPT

## 2022-11-09 RX ORDER — BUSPIRONE HYDROCHLORIDE 30 MG/1
TABLET ORAL
Qty: 60 TABLET | Refills: 0 | Status: SHIPPED | OUTPATIENT
Start: 2022-11-09

## 2022-11-09 RX ORDER — HYDROXYZINE PAMOATE 50 MG/1
CAPSULE ORAL
Qty: 30 CAPSULE | Refills: 0 | Status: SHIPPED | OUTPATIENT
Start: 2022-11-09

## 2022-11-09 RX ORDER — HYDROCHLOROTHIAZIDE 50 MG/1
TABLET ORAL
Qty: 30 TABLET | Refills: 0 | Status: SHIPPED | OUTPATIENT
Start: 2022-11-09

## 2022-11-21 ENCOUNTER — HOSPITAL ENCOUNTER (OUTPATIENT)
Dept: CT IMAGING | Facility: HOSPITAL | Age: 67
Discharge: HOME | End: 2022-11-21
Admitting: INTERNAL MEDICINE

## 2022-11-21 DIAGNOSIS — I73.9 PVD (PERIPHERAL VASCULAR DISEASE) WITH CLAUDICATION: ICD-10-CM

## 2022-11-21 DIAGNOSIS — I70.0 ATHEROSCLEROSIS OF AORTA: ICD-10-CM

## 2022-11-21 LAB — CREAT BLDA-MCNC: 0.8 MG/DL (ref 0.6–1.3)

## 2022-11-21 PROCEDURE — 0 IOPAMIDOL PER 1 ML: Performed by: INTERNAL MEDICINE

## 2022-11-21 PROCEDURE — 82565 ASSAY OF CREATININE: CPT

## 2022-11-21 PROCEDURE — 75635 CT ANGIO ABDOMINAL ARTERIES: CPT

## 2022-11-21 RX ADMIN — IOPAMIDOL 110 ML: 755 INJECTION, SOLUTION INTRAVENOUS at 14:05

## 2022-12-08 ENCOUNTER — OFFICE VISIT (OUTPATIENT)
Dept: CARDIOLOGY | Facility: CLINIC | Age: 67
End: 2022-12-08

## 2022-12-08 VITALS
BODY MASS INDEX: 19.88 KG/M2 | HEIGHT: 69 IN | DIASTOLIC BLOOD PRESSURE: 60 MMHG | SYSTOLIC BLOOD PRESSURE: 140 MMHG | OXYGEN SATURATION: 99 % | HEART RATE: 63 BPM | WEIGHT: 134.2 LBS

## 2022-12-08 DIAGNOSIS — R06.09 DYSPNEA ON EXERTION: ICD-10-CM

## 2022-12-08 DIAGNOSIS — I73.9 PVD (PERIPHERAL VASCULAR DISEASE) WITH CLAUDICATION: Primary | ICD-10-CM

## 2022-12-08 DIAGNOSIS — R09.89 BILATERAL CAROTID BRUITS: ICD-10-CM

## 2022-12-08 DIAGNOSIS — I44.7 LBBB (LEFT BUNDLE BRANCH BLOCK): ICD-10-CM

## 2022-12-08 PROBLEM — I65.23 BILATERAL CAROTID ARTERY STENOSIS: Status: ACTIVE | Noted: 2022-12-08

## 2022-12-08 PROCEDURE — 99214 OFFICE O/P EST MOD 30 MIN: CPT | Performed by: INTERNAL MEDICINE

## 2022-12-08 RX ORDER — CLOPIDOGREL BISULFATE 75 MG/1
75 TABLET ORAL DAILY
Qty: 90 TABLET | Refills: 3 | Status: SHIPPED | OUTPATIENT
Start: 2022-12-08

## 2022-12-08 NOTE — PROGRESS NOTES
[facility] Cardiology  Office visit  Katiana High  1955  [phone]  There is no work phone number on file.    VISIT DATE:  12/8/2022    PCP: Meron Hackett MD  [address] [place] [id number]    CC:  Chief Complaint   Patient presents with   • Dyspnea on exertion       Previous cardiac studies and procedures:  June 2021 myocardial perfusion imaging  · Lexiscan Stress myocardial perfusion scan within normal limits.   · No evidence of ischemia.   · No evidence of prior myocardial injury.   · Left ventricular systolic function c/w LBBB ( LVEF 45% ).   March 2022: Aortobifemoral bypass.  April 2022 ABIs: Right 0.66.  Left 0.53.    November 2022  TTE   Ejection fraction is visually estimated to be 55-60 %.    Diastolic filling parameters suggests grade I diastolic dysfunction .   Bilateral carotid duplex  50 to 69% bilaterally  *  Spectral analysis of the Right internal carotid artery reveals peak systolic velocity 234 cm/s with end-diastolic velocity of 37 cm/s.   *  Spectral analysis of the Left internal carotid demonstrates peak systolic velocity of 197 cm/s with end-diastolic velocity 45 cm/s.   CTA abdominal aorta with runoffs bilaterally  1. Patent aortobifemoral bypass graft, with a questionable borderline  angiographically significant stenosis of the right femoral anastomosis.  Correlation with duplex bypass graft scanning may prove useful here.  2. Multilevel bilateral infrainguinal arterial occlusive disease, worse  on the right.  3. Moderate grade ostial stenosis, right main renal artery. Recommend  clinical correlation.    ASSESSMENT:   Diagnosis Plan   1. PVD (peripheral vascular disease) with claudication (HCC)        2. LBBB (left bundle branch block)        3. Dyspnea on exertion        4. Bilateral carotid bruits            PLAN:  Peripheral vascular disease: CTA revealing patent aortobifemoral graft.  There is potential borderline right femoral anastomosis  stenosis, she is currently reporting most of her limitations on the left which actually appears to have the more improved perfusion based on her recent CTA which makes me suspect that her symptoms are musculoskeletal in nature.  Continue aspirin, high intensity statin therapy and afterload reduction.  Encourage continued regular exercise.    Adding clopidogrel 75 mg p.o. daily.  Suspect right innominate artery stenosis based on exam, currently asymptomatic.    Left bundle branch block with associated dyspnea on exertion: Echo revealing normal myocardial structure and function.    Bilateral carotid artery stenosis, moderate: Continue aspirin and high intensity statin therapy.  Duplex surveillance every 1 to 2 years.     Hypertension: Well-controlled at home.  Goal blood pressure less than 130/80 mmHg.  Continue current medical therapy.     Hyperlipidemia: Goal LDL less than 70.  Continue rosuvastatin 40 mg p.o. daily and Zetia 10 mg p.o. daily.     Nicotine abuse: Counseled on need for smoking cessation.  She is currently not ready to quit.    Subjective  Interval assessment: No change in baseline functional capacity.  Smoking is remain the same.  Blood pressures running less than 140/90 mmHg.  She is compliant with medical therapy.    Initial evaluation: 67-year-old female, active smoker, a pack to 2 packs/day for the past 50 years.  History of peripheral vascular disease, left bundle branch block, oral cancer status postsurgical resection, dyslipidemia and hypertension.  She reports her blood pressures at home are less than 130/80 mmHg.  She describes weakness in her legs bilaterally with such activities as walking up stairs or walking up an incline.  Left sided weakness is worse than right.  Compliant with medical therapy.  She describes having a lower extremity peripheral vascular intervention which was not successful and then her follow-up aortobifemoral bypass in March of this year.  Both [facility] [place] [facility]    PHYSICAL EXAMINATION:  Vitals:    12/08/22 1444   BP: 140/60   BP Location: Right arm   Patient Position: Sitting   Pulse: 63   SpO2: 99%   Weight: 60.9 kg (134 lb 3.2 oz)   Height: 175.3 cm (69\")     General Appearance:    Alert, cooperative, no distress, appears stated age   Head:    Normocephalic, without obvious abnormality, atraumatic   Eyes:    conjunctiva/corneas clear   Nose:   Nares normal, septum midline, mucosa normal, no drainage   Throat:   Lips, teeth and gums normal   Neck:   Supple, symmetrical, trachea midline, bilateral carotid bruits.   Lungs:    Diminished throughout bilaterally, respirations unlabored   Chest Wall:    No tenderness or deformity, bruit overlying the right mid clavicular region.    Heart:    Regular rate and rhythm, S1 and S2 normal, no murmur, rub   or gallop, normal carotid impulse bilaterally without bruit.   Abdomen:     Soft, non-tender   Extremities:   Extremities normal, atraumatic, no cyanosis or edema   Pulses:   2+ and symmetric all extremities   Skin:   Skin color, texture, turgor normal, no rashes or lesions       Diagnostic Data:  Procedures  Lab Results   Component Value Date    CHLPL 149 08/31/2022    TRIG 119 08/31/2022    HDL 39 (L) 08/31/2022     Lab Results   Component Value Date    CREATININE 0.80 11/21/2022    K 2.7 (C) 06/10/2021     No results found for: HGBA1C  Lab Results   Component Value Date    WBC 7.8 08/31/2022    HGB 13.3 08/31/2022    HCT 40.3 08/31/2022     08/31/2022       Allergies  No Known Allergies    Current Medications    Current Outpatient Medications:   •  aspirin 81 MG EC tablet, Take 1 tablet by mouth Daily., Disp: , Rfl:   •  atenolol (TENORMIN) 100 MG tablet, Take 1 tablet by mouth Daily., Disp: , Rfl:   •  busPIRone (BUSPAR) 30 MG tablet, Take 1 tablet by mouth 2 (Two) Times a Day., Disp: , Rfl:   •  Calcium Polycarbophil (FIBERCON PO), Take  by mouth., Disp: , Rfl:   •  ezetimibe " (ZETIA) 10 MG tablet, Take 1 tablet by mouth Daily., Disp: 30 tablet, Rfl: 5  •  hydroCHLOROthiazide (HYDRODIURIL) 50 MG tablet, Take 0.5 tablets by mouth Daily., Disp: , Rfl:   •  hydrOXYzine pamoate (VISTARIL) 50 MG capsule, Take 1 capsule by mouth Daily., Disp: , Rfl:   •  levothyroxine (SYNTHROID, LEVOTHROID) 50 MCG tablet, Take 1 tablet by mouth Daily., Disp: , Rfl:   •  pyridoxine (VITAMIN B-6) 100 MG tablet, Take 1 tablet by mouth Daily., Disp: , Rfl:   •  rosuvastatin (CRESTOR) 40 MG tablet, Take 1 tablet by mouth Daily., Disp: , Rfl:   •  vitamin D (ERGOCALCIFEROL) 1.25 MG (43827 UT) capsule capsule, Take 1 capsule by mouth 1 (One) Time Per Week., Disp: , Rfl:           ROS  ROS      SOCIAL HX  Social History     Socioeconomic History   • Marital status:    Tobacco Use   • Smoking status: Every Day     Packs/day: 1.00     Types: Cigarettes   Substance and Sexual Activity   • Alcohol use: Not Currently   • Drug use: Never       FAMILY HX  Family History   Problem Relation Age of Onset   • Cancer Mother    • Cancer Sister              Nino Bates III, MD, FACC

## 2022-12-19 RX ORDER — ERGOCALCIFEROL 1.25 MG/1
50000 CAPSULE ORAL WEEKLY
Qty: 12 CAPSULE | Refills: 1 | Status: SHIPPED | OUTPATIENT
Start: 2022-12-19

## 2023-02-06 ENCOUNTER — HOSPITAL ENCOUNTER (OUTPATIENT)
Facility: HOSPITAL | Age: 68
Discharge: HOME | End: 2023-02-06
Payer: MEDICARE

## 2023-02-06 ENCOUNTER — OFFICE VISIT (OUTPATIENT)
Dept: PRIMARY CARE CLINIC | Age: 68
End: 2023-02-06
Payer: MEDICARE

## 2023-02-06 VITALS
OXYGEN SATURATION: 98 % | TEMPERATURE: 97.3 F | WEIGHT: 131.8 LBS | HEART RATE: 66 BPM | BODY MASS INDEX: 19.46 KG/M2 | DIASTOLIC BLOOD PRESSURE: 60 MMHG | SYSTOLIC BLOOD PRESSURE: 124 MMHG

## 2023-02-06 DIAGNOSIS — I10 BENIGN HYPERTENSION: Primary | ICD-10-CM

## 2023-02-06 DIAGNOSIS — E78.5 HYPERLIPIDEMIA, UNSPECIFIED HYPERLIPIDEMIA TYPE: ICD-10-CM

## 2023-02-06 DIAGNOSIS — I10 BENIGN HYPERTENSION: ICD-10-CM

## 2023-02-06 DIAGNOSIS — I73.9 PVD (PERIPHERAL VASCULAR DISEASE) (HCC): ICD-10-CM

## 2023-02-06 DIAGNOSIS — E03.9 HYPOTHYROIDISM, UNSPECIFIED TYPE: ICD-10-CM

## 2023-02-06 LAB
A/G RATIO: 1.8 (ref 0.8–2)
ALBUMIN SERPL-MCNC: 4.7 G/DL (ref 3.4–4.8)
ALP BLD-CCNC: 91 U/L (ref 25–100)
ALT SERPL-CCNC: 24 U/L (ref 4–36)
ANION GAP SERPL CALCULATED.3IONS-SCNC: 11 MMOL/L (ref 3–16)
AST SERPL-CCNC: 26 U/L (ref 8–33)
BILIRUB SERPL-MCNC: <0.2 MG/DL (ref 0.3–1.2)
BUN BLDV-MCNC: 18 MG/DL (ref 6–20)
CALCIUM SERPL-MCNC: 10.5 MG/DL (ref 8.5–10.5)
CHLORIDE BLD-SCNC: 104 MMOL/L (ref 98–107)
CO2: 29 MMOL/L (ref 20–30)
CREAT SERPL-MCNC: 0.8 MG/DL (ref 0.4–1.2)
GFR SERPL CREATININE-BSD FRML MDRD: >60 ML/MIN/{1.73_M2}
GLOBULIN: 2.6 G/DL
GLUCOSE BLD-MCNC: 89 MG/DL (ref 74–106)
POTASSIUM SERPL-SCNC: 4.6 MMOL/L (ref 3.4–5.1)
SODIUM BLD-SCNC: 144 MMOL/L (ref 136–145)
TOTAL PROTEIN: 7.3 G/DL (ref 6.4–8.3)
TSH SERPL DL<=0.05 MIU/L-ACNC: 3.12 UIU/ML (ref 0.27–4.2)

## 2023-02-06 PROCEDURE — 1123F ACP DISCUSS/DSCN MKR DOCD: CPT | Performed by: INTERNAL MEDICINE

## 2023-02-06 PROCEDURE — 36415 COLL VENOUS BLD VENIPUNCTURE: CPT

## 2023-02-06 PROCEDURE — 80053 COMPREHEN METABOLIC PANEL: CPT

## 2023-02-06 PROCEDURE — 84443 ASSAY THYROID STIM HORMONE: CPT

## 2023-02-06 PROCEDURE — 99213 OFFICE O/P EST LOW 20 MIN: CPT | Performed by: INTERNAL MEDICINE

## 2023-02-06 PROCEDURE — 3078F DIAST BP <80 MM HG: CPT | Performed by: INTERNAL MEDICINE

## 2023-02-06 PROCEDURE — 3074F SYST BP LT 130 MM HG: CPT | Performed by: INTERNAL MEDICINE

## 2023-02-06 RX ORDER — LEVOTHYROXINE SODIUM 0.05 MG/1
50 TABLET ORAL DAILY
Qty: 90 TABLET | Refills: 1 | Status: SHIPPED | OUTPATIENT
Start: 2023-02-06

## 2023-02-06 RX ORDER — HYDROCHLOROTHIAZIDE 12.5 MG/1
12.5 TABLET ORAL DAILY PRN
Qty: 30 TABLET | Refills: 1 | Status: SHIPPED | OUTPATIENT
Start: 2023-02-06

## 2023-02-06 RX ORDER — ERGOCALCIFEROL 1.25 MG/1
50000 CAPSULE ORAL WEEKLY
Qty: 12 CAPSULE | Refills: 1 | Status: SHIPPED | OUTPATIENT
Start: 2023-02-06

## 2023-02-06 RX ORDER — EZETIMIBE 10 MG/1
10 TABLET ORAL DAILY
COMMUNITY
Start: 2022-10-28

## 2023-02-06 RX ORDER — LEVOTHYROXINE SODIUM 0.05 MG/1
50 TABLET ORAL DAILY
Qty: 90 TABLET | Refills: 1 | OUTPATIENT
Start: 2023-02-06

## 2023-02-06 RX ORDER — ATENOLOL 100 MG/1
100 TABLET ORAL DAILY
Qty: 90 TABLET | Refills: 1 | Status: SHIPPED | OUTPATIENT
Start: 2023-02-06

## 2023-02-06 RX ORDER — CALCIUM POLYCARBOPHIL 625 MG 625 MG/1
TABLET ORAL
COMMUNITY

## 2023-02-06 RX ORDER — ROSUVASTATIN CALCIUM 40 MG/1
40 TABLET, COATED ORAL DAILY
Qty: 90 TABLET | Refills: 1 | Status: SHIPPED | OUTPATIENT
Start: 2023-02-06

## 2023-02-06 RX ORDER — CLOPIDOGREL BISULFATE 75 MG/1
75 TABLET ORAL DAILY
COMMUNITY
Start: 2022-12-08

## 2023-02-06 RX ORDER — ATENOLOL 100 MG/1
100 TABLET ORAL DAILY
Qty: 90 TABLET | Refills: 1 | OUTPATIENT
Start: 2023-02-06

## 2023-02-06 RX ORDER — HYDROCHLOROTHIAZIDE 12.5 MG/1
25 TABLET ORAL DAILY PRN
Qty: 30 TABLET | Refills: 1 | Status: SHIPPED | OUTPATIENT
Start: 2023-02-06 | End: 2023-02-06

## 2023-02-06 RX ORDER — ASPIRIN 81 MG/1
81 TABLET ORAL DAILY
Qty: 90 TABLET | Refills: 1 | Status: SHIPPED | OUTPATIENT
Start: 2023-02-06

## 2023-02-06 RX ORDER — ROSUVASTATIN CALCIUM 40 MG/1
40 TABLET, COATED ORAL DAILY
Qty: 90 TABLET | Refills: 1 | OUTPATIENT
Start: 2023-02-06

## 2023-02-06 RX ORDER — HYDROXYZINE PAMOATE 50 MG/1
50 CAPSULE ORAL NIGHTLY PRN
Qty: 30 CAPSULE | Refills: 2 | Status: SHIPPED | OUTPATIENT
Start: 2023-02-06 | End: 2023-02-06

## 2023-02-06 RX ORDER — PYRIDOXINE HCL (VITAMIN B6) 100 MG
100 TABLET ORAL DAILY
Qty: 90 TABLET | Refills: 1 | Status: SHIPPED | OUTPATIENT
Start: 2023-02-06

## 2023-02-06 RX ORDER — BUSPIRONE HYDROCHLORIDE 30 MG/1
30 TABLET ORAL 2 TIMES DAILY
Qty: 60 TABLET | Refills: 3 | Status: SHIPPED | OUTPATIENT
Start: 2023-02-06

## 2023-02-06 ASSESSMENT — ENCOUNTER SYMPTOMS
COUGH: 0
SINUS PRESSURE: 0
NAUSEA: 0
SHORTNESS OF BREATH: 0
VOMITING: 0
BACK PAIN: 0
WHEEZING: 0
ABDOMINAL PAIN: 0
SORE THROAT: 0
EYE DISCHARGE: 0

## 2023-02-06 NOTE — PROGRESS NOTES
SUBJECTIVE:    Patient ID: Lencho Loza is a 79 y. o.female. Chief Complaint   Patient presents with    Hypertension    Insomnia         HPI:  Patient has had hypertension and hyperlipidemia for several years. She has been compliant with taking medications, without side effects from it. She has been following appropriate diet. She is not active and never exercises. Weight is stable, compared to last visit. Her blood pressure is stable at this time. Patient  has had hypothyroidism for the past few years. She has been compliant with taking her medication without side effects. There are no symptoms of hypo or hyper thyroidism. Patient's medications, allergies, past medical, surgical, social and family histories were reviewed and updated as appropriate in electronic medical record. Outpatient Medications Marked as Taking for the 2/6/23 encounter (Office Visit) with Michaela Chaparro MD   Medication Sig Dispense Refill    ezetimibe (ZETIA) 10 MG tablet Take 10 mg by mouth daily      clopidogrel (PLAVIX) 75 MG tablet Take 75 mg by mouth daily      polycarbophil (FIBERCON) 625 MG tablet Take by mouth      vitamin D (ERGOCALCIFEROL) 1.25 MG (14243 UT) CAPS capsule TAKE 1 CAPSULE BY MOUTH ONCE A WEEK 12 capsule 1    aspirin 81 MG EC tablet Take 1 tablet by mouth daily 90 tablet 1    atenolol (TENORMIN) 100 MG tablet Take 1 tablet by mouth daily 90 tablet 1    levothyroxine (SYNTHROID) 50 MCG tablet Take 1 tablet by mouth daily 90 tablet 1    pyridoxine (B-6) 100 MG tablet Take 1 tablet by mouth daily 90 tablet 1    rosuvastatin (CRESTOR) 40 MG tablet Take 1 tablet by mouth daily 90 tablet 1    albuterol sulfate HFA (VENTOLIN HFA) 108 (90 Base) MCG/ACT inhaler Inhale 2 puffs into the lungs every 6 hours as needed for Wheezing 18 g 1   HCTZ 50 mg p.o. daily. Review of Systems   Constitutional:  Negative for chills and fever. HENT:  Negative for congestion, sinus pressure and sore throat.     Eyes: Negative for discharge and visual disturbance.   Respiratory:  Negative for cough, shortness of breath and wheezing.    Cardiovascular:  Negative for chest pain and palpitations.   Gastrointestinal:  Negative for abdominal pain, nausea and vomiting.   Endocrine: Negative for cold intolerance and heat intolerance.   Genitourinary:  Negative for dysuria, frequency and urgency.   Musculoskeletal:  Positive for arthralgias and gait problem. Negative for back pain.   Skin:  Negative for rash and wound.   Neurological:  Negative for syncope, numbness and headaches.   Hematological: Negative.    Psychiatric/Behavioral:  Negative for agitation and sleep disturbance. The patient is not nervous/anxious.      Past Medical History:   Diagnosis Date    Cancer of tongue (HCC)     Hyperthyroidism      Past Surgical History:   Procedure Laterality Date    VASCULAR SURGERY Bilateral      Family History   Problem Relation Age of Onset    Breast Cancer Mother       Social History     Tobacco Use   Smoking Status Every Day    Packs/day: 1.00    Years: 54.00    Pack years: 54.00    Types: Cigarettes   Smokeless Tobacco Never       OBJECTIVE:   Wt Readings from Last 3 Encounters:   02/06/23 131 lb 12.8 oz (59.8 kg)   10/04/22 129 lb (58.5 kg)   08/31/22 127 lb (57.6 kg)     BP Readings from Last 3 Encounters:   02/06/23 124/60   08/18/22 (!) 100/52       /60 (Site: Left Upper Arm, Position: Sitting, Cuff Size: Medium Adult)   Pulse 66   Temp 97.3 °F (36.3 °C) (Temporal)   Wt 131 lb 12.8 oz (59.8 kg)   LMP 07/01/2002 (Approximate)   SpO2 98% Comment: room air  BMI 19.46 kg/m²      Physical Exam  Vitals and nursing note reviewed.   Constitutional:       Appearance: Normal appearance. She is well-developed.      Comments: Using cane    HENT:      Head: Normocephalic and atraumatic.      Right Ear: External ear normal.      Left Ear: External ear normal.      Nose: Nose normal.      Mouth/Throat:      Mouth: Mucous membranes are  moist.      Pharynx: Oropharynx is clear. Eyes:      Conjunctiva/sclera: Conjunctivae normal.      Pupils: Pupils are equal, round, and reactive to light. Neck:      Thyroid: No thyromegaly. Vascular: No JVD. Cardiovascular:      Rate and Rhythm: Normal rate and regular rhythm. Heart sounds: Normal heart sounds. Pulmonary:      Effort: Pulmonary effort is normal.      Breath sounds: Normal breath sounds. No wheezing or rales. Abdominal:      General: Bowel sounds are normal. There is no distension. Palpations: Abdomen is soft. Tenderness: There is no abdominal tenderness. Musculoskeletal:         General: No tenderness. Cervical back: Neck supple. No rigidity. No muscular tenderness. Right lower leg: No edema. Left lower leg: No edema. Skin:     Findings: No erythema or rash. Neurological:      General: No focal deficit present. Mental Status: She is alert and oriented to person, place, and time. Psychiatric:         Behavior: Behavior normal.         Judgment: Judgment normal.       Lab Results   Component Value Date/Time     08/31/2022 08:04 AM    K 4.3 08/31/2022 08:04 AM     08/31/2022 08:04 AM    CO2 24 08/31/2022 08:04 AM    GLUCOSE 87 08/31/2022 08:04 AM    BUN 20 08/31/2022 08:04 AM    CREATININE 1.0 08/31/2022 08:04 AM    CALCIUM 10.4 08/31/2022 08:04 AM    PROT 7.2 08/31/2022 08:04 AM    LABALBU 4.8 08/31/2022 08:04 AM    BILITOT 0.4 08/31/2022 08:04 AM    ALT 17 08/31/2022 08:04 AM    AST 18 08/31/2022 08:04 AM       LDL Calculated (mg/dL)   Date Value   08/31/2022 86         Lab Results   Component Value Date/Time    WBC 7.8 08/31/2022 08:04 AM    NEUTROABS 4.3 08/31/2022 08:04 AM    HGB 13.3 08/31/2022 08:04 AM    HCT 40.3 08/31/2022 08:04 AM    MCV 87.8 08/31/2022 08:04 AM     08/31/2022 08:04 AM       Lab Results   Component Value Date    TSH 5.24 (H) 08/31/2022         ASSESSMENT/PLAN:     1.  Benign hypertension  BP is stable. I have advised her on low-sodium diet, exercise and weight control. I am going to continue current medication except for lowering HCTZ. Will monitor her renal function every few months, have advised her to check blood pressure frequently and to keep a record of this. 2. Hypothyroidism, unspecified type  Last TSH was borderline elevated. I am going to check the TSH every few months. I am going to continue the current dose of Levothyroxine. I have advised her on the importance of taking the medication on a daily basis. 3. Hyperlipidemia, unspecified hyperlipidemia type  I have advised her on low-fat diet, exercise and weight control. I am going to continue on current medication. I have also advised her on the possible side effects from the medication. I will monitor her liver functions and lipid profile every few months. Lipid close to goal (+PVD). Lab Results   Component Value Date    LDLCALC 86 08/31/2022     4. PVD (peripheral vascular disease) (HCC)  Continue aspirin and statin. Try to get her lipid profile under better control. Discussed the importance of increased activity level as able and in a safe way. Discussed using cane or a walker all the time. Patient to continue following up with vascular surgeon. Long conversation with her regarding her ongoing smoking which she is not interested in doing anything on that part.     Will request report [facility] in April (not available to me on EMR)    Bone Density in 2024    Orders Placed This Encounter   Medications    DISCONTD: hydroCHLOROthiazide (HYDRODIURIL) 12.5 MG tablet     Sig: Take 2 tablets by mouth daily as needed (swelling)     Dispense:  30 tablet     Refill:  1    DISCONTD: hydrOXYzine pamoate (VISTARIL) 50 MG capsule     Sig: Take 1 capsule by mouth nightly as needed (sleep)     Dispense:  30 capsule     Refill:  2    busPIRone (BUSPAR) 30 MG tablet     Sig: Take 30 mg by mouth in the morning and at bedtime Dispense:  60 tablet     Refill:  3    hydroCHLOROthiazide (HYDRODIURIL) 12.5 MG tablet     Sig: Take 1 tablet by mouth daily as needed (swelling)     Dispense:  30 tablet     Refill:  1    vitamin D (ERGOCALCIFEROL) 1.25 MG (75433 UT) CAPS capsule     Sig: Take 1 capsule by mouth once a week     Dispense:  12 capsule     Refill:  1    aspirin 81 MG EC tablet     Sig: Take 1 tablet by mouth daily     Dispense:  90 tablet     Refill:  1    atenolol (TENORMIN) 100 MG tablet     Sig: Take 1 tablet by mouth daily     Dispense:  90 tablet     Refill:  1    levothyroxine (SYNTHROID) 50 MCG tablet     Sig: Take 1 tablet by mouth daily     Dispense:  90 tablet     Refill:  1    pyridoxine (B-6) 100 MG tablet     Sig: Take 1 tablet by mouth daily     Dispense:  90 tablet     Refill:  1    rosuvastatin (CRESTOR) 40 MG tablet     Sig: Take 1 tablet by mouth daily     Dispense:  90 tablet     Refill:  1

## 2023-03-10 RX ORDER — HYDROCHLOROTHIAZIDE 12.5 MG/1
12.5 TABLET ORAL DAILY PRN
Qty: 30 TABLET | Refills: 1 | Status: SHIPPED | OUTPATIENT
Start: 2023-03-10

## 2023-03-22 RX ORDER — EZETIMIBE 10 MG/1
10 TABLET ORAL DAILY
Qty: 30 TABLET | Refills: 3 | Status: SHIPPED | OUTPATIENT
Start: 2023-03-22

## 2023-05-10 RX ORDER — HYDROCHLOROTHIAZIDE 12.5 MG/1
12.5 TABLET ORAL DAILY PRN
Qty: 30 TABLET | Refills: 1 | Status: SHIPPED | OUTPATIENT
Start: 2023-05-10

## 2023-05-10 RX ORDER — LEVOTHYROXINE SODIUM 0.05 MG/1
50 TABLET ORAL DAILY
Qty: 90 TABLET | Refills: 1 | Status: SHIPPED | OUTPATIENT
Start: 2023-05-10

## 2023-05-10 RX ORDER — BUSPIRONE HYDROCHLORIDE 30 MG/1
TABLET ORAL
Qty: 60 TABLET | Refills: 3 | Status: SHIPPED | OUTPATIENT
Start: 2023-05-10

## 2023-05-10 RX ORDER — ROSUVASTATIN CALCIUM 40 MG/1
40 TABLET, COATED ORAL DAILY
Qty: 90 TABLET | Refills: 1 | Status: SHIPPED | OUTPATIENT
Start: 2023-05-10

## 2023-05-10 RX ORDER — ATENOLOL 100 MG/1
100 TABLET ORAL DAILY
Qty: 90 TABLET | Refills: 1 | Status: SHIPPED | OUTPATIENT
Start: 2023-05-10

## 2023-05-10 RX ORDER — HYDROXYZINE PAMOATE 50 MG/1
50 CAPSULE ORAL NIGHTLY PRN
Qty: 30 CAPSULE | Refills: 2 | OUTPATIENT
Start: 2023-05-10

## 2023-06-08 ENCOUNTER — OFFICE VISIT (OUTPATIENT)
Dept: CARDIOLOGY | Facility: CLINIC | Age: 68
End: 2023-06-08
Payer: MEDICARE

## 2023-06-08 VITALS
WEIGHT: 132.2 LBS | BODY MASS INDEX: 19.58 KG/M2 | OXYGEN SATURATION: 98 % | SYSTOLIC BLOOD PRESSURE: 130 MMHG | HEIGHT: 69 IN | DIASTOLIC BLOOD PRESSURE: 58 MMHG | HEART RATE: 67 BPM

## 2023-06-08 DIAGNOSIS — I73.9 PVD (PERIPHERAL VASCULAR DISEASE) WITH CLAUDICATION: Primary | ICD-10-CM

## 2023-06-08 DIAGNOSIS — I65.23 BILATERAL CAROTID ARTERY STENOSIS: ICD-10-CM

## 2023-06-08 DIAGNOSIS — Z91.89 AT RISK FOR PERIPHERAL NEUROPATHY: ICD-10-CM

## 2023-06-08 DIAGNOSIS — I44.7 LBBB (LEFT BUNDLE BRANCH BLOCK): ICD-10-CM

## 2023-06-08 PROCEDURE — 99214 OFFICE O/P EST MOD 30 MIN: CPT | Performed by: INTERNAL MEDICINE

## 2023-06-08 NOTE — PROGRESS NOTES
[facility] Cardiology  Office visit  Katiana High  1955  [phone]  There is no work phone number on file.    VISIT DATE:  6/8/2023    PCP: Meron Hackett MD  [address] [place] [id number]    CC:  Chief Complaint   Patient presents with    LBBB    PVD (peripheral vascular disease) with claudication       Previous cardiac studies and procedures:  June 2021 myocardial perfusion imaging  · Lexiscan Stress myocardial perfusion scan within normal limits.   · No evidence of ischemia.   · No evidence of prior myocardial injury.   · Left ventricular systolic function c/w LBBB ( LVEF 45% ).   March 2022: Aortobifemoral bypass.  April 2022 ABIs: Right 0.66.  Left 0.53.    November 2022  TTE   Ejection fraction is visually estimated to be 55-60 %.    Diastolic filling parameters suggests grade I diastolic dysfunction .   Bilateral carotid duplex  50 to 69% bilaterally  *  Spectral analysis of the Right internal carotid artery reveals peak systolic velocity 234 cm/s with end-diastolic velocity of 37 cm/s.   *  Spectral analysis of the Left internal carotid demonstrates peak systolic velocity of 197 cm/s with end-diastolic velocity 45 cm/s.   CTA abdominal aorta with runoffs bilaterally  1. Patent aortobifemoral bypass graft, with a questionable borderline  angiographically significant stenosis of the right femoral anastomosis.  Correlation with duplex bypass graft scanning may prove useful here.  2. Multilevel bilateral infrainguinal arterial occlusive disease, worse  on the right.  3. Moderate grade ostial stenosis, right main renal artery. Recommend  clinical correlation.    ASSESSMENT:   Diagnosis Plan   1. PVD (peripheral vascular disease) with claudication        2. LBBB (left bundle branch block)        3. Bilateral carotid artery stenosis            PLAN:  Peripheral vascular disease: CTA revealing patent aortobifemoral graft.  There is potential borderline right femoral  anastomosis stenosis, she is currently reporting most of her limitations on the left which actually appears to have the more improved perfusion based on her recent CTA which makes me suspect that her symptoms are musculoskeletal/neuropathic in nature.  Continue aspirin, clopidogrel, high intensity statin therapy and afterload reduction.  Encourage continued regular exercise.    Suspect right innominate artery stenosis based on exam, currently asymptomatic.    Left bundle branch block with associated dyspnea on exertion: Echo revealing normal myocardial structure and function.    Bilateral carotid artery stenosis, moderate: Continue aspirin and high intensity statin therapy.  Duplex surveillance every 1 to 2 years.     Hypertension: Well-controlled at home.  Goal blood pressure less than 130/80 mmHg.  Continue current medical therapy.     Hyperlipidemia: Goal LDL less than 70.  Continue rosuvastatin 40 mg p.o. daily and Zetia 10 mg p.o. daily.     Nicotine abuse: Counseled on need for smoking cessation.  She is currently not ready to quit.    Bilateral lower extremity nocturnal paresthesias: Recommending neurology evaluation for potential underlying peripheral neuropathy.    Subjective  Interval assessment: No change in baseline functional capacity.  Continues to smoke.  Blood pressures running less than 140/90 mmHg.  She is compliant with medical therapy.  Limiting bilateral lower extremity claudication, right greater than left and a class II pattern.  Also complaining of nocturnal bilateral leg and foot discomfort which is different from her claudication symptoms.  Inducing insomnia.    Initial evaluation: 67-year-old female, active smoker, a pack to 2 packs/day for the past 50 years.  History of peripheral vascular disease, left bundle branch block, oral cancer status postsurgical resection, dyslipidemia and hypertension.  She reports her blood pressures at home are less than 130/80 mmHg.  She describes weakness  "in her legs bilaterally with such activities as walking up stairs or walking up an incline.  Left sided weakness is worse than right.  Compliant with medical therapy.  She describes having a lower extremity peripheral vascular intervention which was not successful and then her follow-up aortobifemoral bypass in March of this year.  Both procedures [facility] in [facility].    PHYSICAL EXAMINATION:  Vitals:    06/08/23 1405   BP: 130/58   BP Location: Right arm   Patient Position: Sitting   Pulse: 67   SpO2: 98%   Weight: 60 kg (132 lb 3.2 oz)   Height: 175.3 cm (69\")     General Appearance:    Alert, cooperative, no distress, appears stated age   Head:    Normocephalic, without obvious abnormality, atraumatic   Eyes:    conjunctiva/corneas clear   Nose:   Nares normal, septum midline, mucosa normal, no drainage   Throat:   Lips, teeth and gums normal   Neck:   Supple, symmetrical, trachea midline, bilateral carotid bruits.   Lungs:    Diminished throughout bilaterally, respirations unlabored   Chest Wall:    No tenderness or deformity, bruit overlying the right mid clavicular region.    Heart:    Regular rate and rhythm, S1 and S2 normal, no murmur, rub   or gallop, normal carotid impulse bilaterally without bruit.   Abdomen:     Soft, non-tender   Extremities:   Extremities normal, atraumatic, no cyanosis or edema   Pulses:   2+ and symmetric all extremities   Skin:   Skin color, texture, turgor normal, no rashes or lesions       Diagnostic Data:  Procedures  Lab Results   Component Value Date    CHLPL 149 08/31/2022    TRIG 119 08/31/2022    HDL 39 (L) 08/31/2022     Lab Results   Component Value Date    CREATININE 0.80 11/21/2022    K 2.7 (C) 06/10/2021     No results found for: HGBA1C  Lab Results   Component Value Date    WBC 7.8 08/31/2022    HGB 13.3 08/31/2022    HCT 40.3 08/31/2022     08/31/2022       Allergies  No Known Allergies    Current Medications    Current Outpatient " Medications:     aspirin 81 MG EC tablet, Take 1 tablet by mouth Daily., Disp: , Rfl:     atenolol (TENORMIN) 100 MG tablet, Take 1 tablet by mouth Daily., Disp: , Rfl:     busPIRone (BUSPAR) 30 MG tablet, Take 1 tablet by mouth 2 (Two) Times a Day., Disp: , Rfl:     Calcium Polycarbophil (FIBERCON PO), Take 1 tablet by mouth 2 (Two) Times a Day., Disp: , Rfl:     clopidogrel (PLAVIX) 75 MG tablet, Take 1 tablet by mouth Daily., Disp: 90 tablet, Rfl: 3    ezetimibe (ZETIA) 10 MG tablet, TAKE 1 TABLET BY MOUTH DAILY., Disp: 30 tablet, Rfl: 3    hydroCHLOROthiazide (HYDRODIURIL) 25 MG tablet, Take 1 tablet by mouth Daily., Disp: , Rfl:     hydrOXYzine pamoate (VISTARIL) 50 MG capsule, Take 1 capsule by mouth Daily., Disp: , Rfl:     levothyroxine (SYNTHROID, LEVOTHROID) 50 MCG tablet, Take 1 tablet by mouth Daily., Disp: , Rfl:     pyridoxine (VITAMIN B-6) 100 MG tablet, Take 1 tablet by mouth Daily., Disp: , Rfl:     rosuvastatin (CRESTOR) 40 MG tablet, Take 1 tablet by mouth Daily., Disp: , Rfl:     vitamin D (ERGOCALCIFEROL) 1.25 MG (61392 UT) capsule capsule, Take 1 capsule by mouth 1 (One) Time Per Week., Disp: , Rfl:           ROS  ROS      SOCIAL HX  Social History     Socioeconomic History    Marital status:    Tobacco Use    Smoking status: Every Day     Packs/day: 1.00     Types: Cigarettes   Vaping Use    Vaping Use: Never used   Substance and Sexual Activity    Alcohol use: Not Currently    Drug use: Never       FAMILY HX  Family History   Problem Relation Age of Onset    Cancer Mother     Cancer Sister              Nino Bates III, MD, FACC

## 2023-06-28 ENCOUNTER — TELEPHONE (OUTPATIENT)
Dept: PRIMARY CARE CLINIC | Age: 68
End: 2023-06-28

## 2023-06-28 DIAGNOSIS — Z12.11 COLON CANCER SCREENING: Primary | ICD-10-CM

## 2023-07-05 ENCOUNTER — OFFICE VISIT (OUTPATIENT)
Dept: PRIMARY CARE CLINIC | Age: 68
End: 2023-07-05
Payer: MEDICARE

## 2023-07-05 VITALS
BODY MASS INDEX: 18.75 KG/M2 | HEART RATE: 64 BPM | SYSTOLIC BLOOD PRESSURE: 128 MMHG | WEIGHT: 127 LBS | RESPIRATION RATE: 18 BRPM | OXYGEN SATURATION: 95 % | DIASTOLIC BLOOD PRESSURE: 64 MMHG

## 2023-07-05 DIAGNOSIS — I73.9 PVD (PERIPHERAL VASCULAR DISEASE) (HCC): ICD-10-CM

## 2023-07-05 DIAGNOSIS — I10 BENIGN HYPERTENSION: Primary | ICD-10-CM

## 2023-07-05 DIAGNOSIS — E78.5 HYPERLIPIDEMIA, UNSPECIFIED HYPERLIPIDEMIA TYPE: ICD-10-CM

## 2023-07-05 DIAGNOSIS — E03.9 HYPOTHYROIDISM, UNSPECIFIED TYPE: ICD-10-CM

## 2023-07-05 PROCEDURE — 99213 OFFICE O/P EST LOW 20 MIN: CPT | Performed by: INTERNAL MEDICINE

## 2023-07-05 PROCEDURE — 3074F SYST BP LT 130 MM HG: CPT | Performed by: INTERNAL MEDICINE

## 2023-07-05 PROCEDURE — 1123F ACP DISCUSS/DSCN MKR DOCD: CPT | Performed by: INTERNAL MEDICINE

## 2023-07-05 PROCEDURE — 3078F DIAST BP <80 MM HG: CPT | Performed by: INTERNAL MEDICINE

## 2023-07-05 RX ORDER — HYDROXYZINE PAMOATE 50 MG/1
CAPSULE ORAL
COMMUNITY
Start: 2023-04-03 | End: 2023-07-05 | Stop reason: SDUPTHER

## 2023-07-05 RX ORDER — HYDROXYZINE PAMOATE 50 MG/1
50 CAPSULE ORAL NIGHTLY PRN
Qty: 30 CAPSULE | Refills: 2 | Status: CANCELLED | OUTPATIENT
Start: 2023-07-05

## 2023-07-05 RX ORDER — HYDROXYZINE PAMOATE 50 MG/1
50 CAPSULE ORAL NIGHTLY PRN
Qty: 30 CAPSULE | Refills: 3 | Status: SHIPPED | OUTPATIENT
Start: 2023-07-05

## 2023-07-05 SDOH — ECONOMIC STABILITY: FOOD INSECURITY: WITHIN THE PAST 12 MONTHS, THE FOOD YOU BOUGHT JUST DIDN'T LAST AND YOU DIDN'T HAVE MONEY TO GET MORE.: NEVER TRUE

## 2023-07-05 SDOH — ECONOMIC STABILITY: FOOD INSECURITY: WITHIN THE PAST 12 MONTHS, YOU WORRIED THAT YOUR FOOD WOULD RUN OUT BEFORE YOU GOT MONEY TO BUY MORE.: NEVER TRUE

## 2023-07-05 SDOH — ECONOMIC STABILITY: HOUSING INSECURITY
IN THE LAST 12 MONTHS, WAS THERE A TIME WHEN YOU DID NOT HAVE A STEADY PLACE TO SLEEP OR SLEPT IN A SHELTER (INCLUDING NOW)?: NO

## 2023-07-05 SDOH — ECONOMIC STABILITY: INCOME INSECURITY: HOW HARD IS IT FOR YOU TO PAY FOR THE VERY BASICS LIKE FOOD, HOUSING, MEDICAL CARE, AND HEATING?: NOT VERY HARD

## 2023-07-05 ASSESSMENT — ENCOUNTER SYMPTOMS
EYE DISCHARGE: 0
SHORTNESS OF BREATH: 0
ABDOMINAL PAIN: 0
NAUSEA: 0
BACK PAIN: 0
COUGH: 0
VOMITING: 0
WHEEZING: 0
SORE THROAT: 0
SINUS PRESSURE: 0

## 2023-07-05 ASSESSMENT — PATIENT HEALTH QUESTIONNAIRE - PHQ9
1. LITTLE INTEREST OR PLEASURE IN DOING THINGS: 0
SUM OF ALL RESPONSES TO PHQ9 QUESTIONS 1 & 2: 0
SUM OF ALL RESPONSES TO PHQ QUESTIONS 1-9: 0
2. FEELING DOWN, DEPRESSED OR HOPELESS: 0
SUM OF ALL RESPONSES TO PHQ QUESTIONS 1-9: 0

## 2023-07-12 LAB — NONINV COLON CA DNA+OCC BLD SCRN STL QL: NORMAL

## 2023-07-24 RX ORDER — EZETIMIBE 10 MG/1
10 TABLET ORAL DAILY
Qty: 30 TABLET | Refills: 3 | Status: SHIPPED | OUTPATIENT
Start: 2023-07-24

## 2023-07-27 LAB — NONINV COLON CA DNA+OCC BLD SCRN STL QL: NEGATIVE

## 2023-08-02 ENCOUNTER — HOSPITAL ENCOUNTER (OUTPATIENT)
Facility: HOSPITAL | Age: 68
Discharge: HOME | End: 2023-08-02
Payer: MEDICARE

## 2023-08-02 DIAGNOSIS — E03.9 HYPOTHYROIDISM, UNSPECIFIED TYPE: ICD-10-CM

## 2023-08-02 DIAGNOSIS — I73.9 PVD (PERIPHERAL VASCULAR DISEASE) (HCC): ICD-10-CM

## 2023-08-02 DIAGNOSIS — E78.5 HYPERLIPIDEMIA, UNSPECIFIED HYPERLIPIDEMIA TYPE: ICD-10-CM

## 2023-08-02 DIAGNOSIS — I10 BENIGN HYPERTENSION: ICD-10-CM

## 2023-08-02 LAB
ALBUMIN SERPL-MCNC: 4.5 G/DL (ref 3.4–4.8)
ALBUMIN/GLOB SERPL: 1.6 {RATIO} (ref 0.8–2)
ALP SERPL-CCNC: 96 U/L (ref 25–100)
ALT SERPL-CCNC: 16 U/L (ref 4–36)
ANION GAP SERPL CALCULATED.3IONS-SCNC: 16 MMOL/L (ref 3–16)
AST SERPL-CCNC: 18 U/L (ref 8–33)
BASOPHILS # BLD: 0.1 K/UL (ref 0–0.1)
BASOPHILS NFR BLD: 1.2 %
BILIRUB SERPL-MCNC: 0.3 MG/DL (ref 0.3–1.2)
BUN SERPL-MCNC: 18 MG/DL (ref 6–20)
CALCIUM SERPL-MCNC: 10.2 MG/DL (ref 8.5–10.5)
CHLORIDE SERPL-SCNC: 106 MMOL/L (ref 98–107)
CHOLEST SERPL-MCNC: 130 MG/DL (ref 0–200)
CO2 SERPL-SCNC: 23 MMOL/L (ref 20–30)
CREAT SERPL-MCNC: 1 MG/DL (ref 0.4–1.2)
EOSINOPHIL # BLD: 0.3 K/UL (ref 0–0.4)
EOSINOPHIL NFR BLD: 3.5 %
ERYTHROCYTE [DISTWIDTH] IN BLOOD BY AUTOMATED COUNT: 14.6 % (ref 11–16)
GFR SERPLBLD CREATININE-BSD FMLA CKD-EPI: >60 ML/MIN/{1.73_M2}
GLOBULIN SER CALC-MCNC: 2.9 G/DL
GLUCOSE SERPL-MCNC: 75 MG/DL (ref 74–106)
HCT VFR BLD AUTO: 44.6 % (ref 37–47)
HDLC SERPL-MCNC: 45 MG/DL (ref 40–60)
HGB BLD-MCNC: 14.5 G/DL (ref 11.5–16.5)
IMM GRANULOCYTES # BLD: 0 K/UL
IMM GRANULOCYTES NFR BLD: 0.3 % (ref 0–5)
LDLC SERPL CALC-MCNC: 54 MG/DL
LYMPHOCYTES # BLD: 2.5 K/UL (ref 1.5–4)
LYMPHOCYTES NFR BLD: 33.8 %
MCH RBC QN AUTO: 28.7 PG (ref 27–32)
MCHC RBC AUTO-ENTMCNC: 32.5 G/DL (ref 31–35)
MCV RBC AUTO: 88.1 FL (ref 80–100)
MONOCYTES # BLD: 0.7 K/UL (ref 0.2–0.8)
MONOCYTES NFR BLD: 9.5 %
NEUTROPHILS # BLD: 3.9 K/UL (ref 2–7.5)
NEUTS SEG NFR BLD: 51.7 %
PLATELET # BLD AUTO: 208 K/UL (ref 150–400)
PMV BLD AUTO: 10 FL (ref 6–10)
POTASSIUM SERPL-SCNC: 4.3 MMOL/L (ref 3.4–5.1)
PROT SERPL-MCNC: 7.4 G/DL (ref 6.4–8.3)
RBC # BLD AUTO: 5.06 M/UL (ref 3.8–5.8)
SODIUM SERPL-SCNC: 145 MMOL/L (ref 136–145)
TRIGL SERPL-MCNC: 153 MG/DL (ref 0–249)
TSH SERPL DL<=0.005 MIU/L-ACNC: 3.98 UIU/ML (ref 0.27–4.2)
VLDLC SERPL CALC-MCNC: 31 MG/DL
WBC # BLD AUTO: 7.5 K/UL (ref 4–11)

## 2023-08-02 PROCEDURE — 80053 COMPREHEN METABOLIC PANEL: CPT

## 2023-08-02 PROCEDURE — 80061 LIPID PANEL: CPT

## 2023-08-02 PROCEDURE — 84443 ASSAY THYROID STIM HORMONE: CPT

## 2023-08-02 PROCEDURE — 85025 COMPLETE CBC W/AUTO DIFF WBC: CPT

## 2023-08-02 PROCEDURE — 36415 COLL VENOUS BLD VENIPUNCTURE: CPT

## 2023-08-07 RX ORDER — ATENOLOL 100 MG/1
100 TABLET ORAL DAILY
Qty: 90 TABLET | Refills: 1 | OUTPATIENT
Start: 2023-08-07

## 2023-08-07 RX ORDER — ROSUVASTATIN CALCIUM 40 MG/1
40 TABLET, COATED ORAL DAILY
Qty: 90 TABLET | Refills: 1 | OUTPATIENT
Start: 2023-08-07

## 2023-08-07 RX ORDER — LEVOTHYROXINE SODIUM 0.05 MG/1
50 TABLET ORAL DAILY
Qty: 90 TABLET | Refills: 1 | OUTPATIENT
Start: 2023-08-07

## 2023-08-09 ENCOUNTER — TELEPHONE (OUTPATIENT)
Dept: PRIMARY CARE CLINIC | Age: 68
End: 2023-08-09

## 2023-08-09 DIAGNOSIS — Z12.31 ENCOUNTER FOR SCREENING MAMMOGRAM FOR BREAST CANCER: Primary | ICD-10-CM

## 2023-08-09 NOTE — TELEPHONE ENCOUNTER
----- Message from Cheyenne Savannah sent at 8/8/2023  8:48 AM EDT -----  Subject: Referral Request    Reason for referral request? Pt needs mammogram due after 08/31   Provider patient wants to be referred to(if known):     Provider Phone Number(if known): Additional Information for Provider?   ---------------------------------------------------------------------------  --------------  Brent Grant Angela    [phone];  Do not leave any message, patient will call back for answer  ---------------------------------------------------------------------------  --------------

## 2023-08-16 ENCOUNTER — OFFICE VISIT (OUTPATIENT)
Dept: PRIMARY CARE CLINIC | Age: 68
End: 2023-08-16
Payer: MEDICARE

## 2023-08-16 VITALS
SYSTOLIC BLOOD PRESSURE: 160 MMHG | DIASTOLIC BLOOD PRESSURE: 62 MMHG | BODY MASS INDEX: 18.58 KG/M2 | OXYGEN SATURATION: 95 % | WEIGHT: 125.8 LBS | RESPIRATION RATE: 18 BRPM | HEART RATE: 60 BPM

## 2023-08-16 DIAGNOSIS — G89.29 CHRONIC MIDLINE LOW BACK PAIN WITHOUT SCIATICA: ICD-10-CM

## 2023-08-16 DIAGNOSIS — M54.50 CHRONIC MIDLINE LOW BACK PAIN WITHOUT SCIATICA: ICD-10-CM

## 2023-08-16 DIAGNOSIS — Z85.810 HISTORY OF TONGUE CANCER: ICD-10-CM

## 2023-08-16 DIAGNOSIS — R07.0 THROAT PAIN: Primary | ICD-10-CM

## 2023-08-16 PROCEDURE — 3078F DIAST BP <80 MM HG: CPT | Performed by: INTERNAL MEDICINE

## 2023-08-16 PROCEDURE — 99213 OFFICE O/P EST LOW 20 MIN: CPT | Performed by: INTERNAL MEDICINE

## 2023-08-16 PROCEDURE — 3074F SYST BP LT 130 MM HG: CPT | Performed by: INTERNAL MEDICINE

## 2023-08-16 PROCEDURE — 1123F ACP DISCUSS/DSCN MKR DOCD: CPT | Performed by: INTERNAL MEDICINE

## 2023-08-16 RX ORDER — GABAPENTIN 100 MG/1
100 CAPSULE ORAL 3 TIMES DAILY
Qty: 90 CAPSULE | Refills: 0 | Status: SHIPPED | OUTPATIENT
Start: 2023-08-16 | End: 2023-09-15

## 2023-08-16 ASSESSMENT — ENCOUNTER SYMPTOMS
SHORTNESS OF BREATH: 0
COUGH: 0
NAUSEA: 0
VOMITING: 0
WHEEZING: 0
ABDOMINAL PAIN: 0
SORE THROAT: 1
BACK PAIN: 1
SINUS PRESSURE: 0
EYE DISCHARGE: 0

## 2023-08-16 NOTE — PROGRESS NOTES
Chief Complaint   Patient presents with    Other     Swollen gland on left upper neck area        Have you seen any other physician or provider since your last visit no    Have you had any other diagnostic tests since your last visit? no    Have you changed or stopped any medications since your last visit? no         Acute visit HM not addressed
Constitutional:       Appearance: Normal appearance. She is well-developed. Comments: Using Cane   HENT:      Head: Normocephalic and atraumatic. Right Ear: External ear normal.      Left Ear: External ear normal.      Nose: Nose normal.      Mouth/Throat:      Mouth: Mucous membranes are moist.      Pharynx: Oropharynx is clear. Eyes:      Conjunctiva/sclera: Conjunctivae normal.      Pupils: Pupils are equal, round, and reactive to light. Neck:      Thyroid: No thyromegaly. Vascular: No JVD. Cardiovascular:      Rate and Rhythm: Normal rate and regular rhythm. Heart sounds: Normal heart sounds. Pulmonary:      Effort: Pulmonary effort is normal.      Breath sounds: Normal breath sounds. No wheezing or rales. Abdominal:      General: Bowel sounds are normal. There is no distension. Palpations: Abdomen is soft. Tenderness: There is no abdominal tenderness. Musculoskeletal:         General: No tenderness. Cervical back: Neck supple. No rigidity. No muscular tenderness. Right lower leg: No edema. Left lower leg: No edema. Skin:     Findings: No erythema or rash. Neurological:      General: No focal deficit present. Mental Status: She is alert and oriented to person, place, and time.    Psychiatric:         Behavior: Behavior normal.         Judgment: Judgment normal.         Lab Results   Component Value Date/Time     08/02/2023 08:19 AM    K 4.3 08/02/2023 08:19 AM     08/02/2023 08:19 AM    CO2 23 08/02/2023 08:19 AM    GLUCOSE 75 08/02/2023 08:19 AM    BUN 18 08/02/2023 08:19 AM    CREATININE 1.0 08/02/2023 08:19 AM    CALCIUM 10.2 08/02/2023 08:19 AM    PROT 7.4 08/02/2023 08:19 AM    LABALBU 4.5 08/02/2023 08:19 AM    BILITOT 0.3 08/02/2023 08:19 AM    ALT 16 08/02/2023 08:19 AM    AST 18 08/02/2023 08:19 AM       LDL Calculated (mg/dL)   Date Value   08/02/2023 54         Lab Results   Component Value Date/Time    WBC 7.5 08/02/2023

## 2023-08-21 ENCOUNTER — HOSPITAL ENCOUNTER (OUTPATIENT)
Dept: GENERAL RADIOLOGY | Facility: HOSPITAL | Age: 68
Discharge: HOME | End: 2023-08-21
Payer: MEDICARE

## 2023-08-21 ENCOUNTER — HOSPITAL ENCOUNTER (OUTPATIENT)
Facility: HOSPITAL | Age: 68
Discharge: HOME | End: 2023-08-21
Payer: MEDICARE

## 2023-08-21 DIAGNOSIS — M54.50 CHRONIC MIDLINE LOW BACK PAIN WITHOUT SCIATICA: ICD-10-CM

## 2023-08-21 DIAGNOSIS — G89.29 CHRONIC MIDLINE LOW BACK PAIN WITHOUT SCIATICA: ICD-10-CM

## 2023-08-21 PROCEDURE — 72110 X-RAY EXAM L-2 SPINE 4/>VWS: CPT

## 2023-08-28 RX ORDER — CLOPIDOGREL BISULFATE 75 MG/1
75 TABLET ORAL DAILY
Qty: 90 TABLET | Refills: 3 | Status: SHIPPED | OUTPATIENT
Start: 2023-08-28

## 2023-08-28 RX ORDER — EZETIMIBE 10 MG/1
10 TABLET ORAL DAILY
Qty: 30 TABLET | Refills: 3 | Status: SHIPPED | OUTPATIENT
Start: 2023-08-28

## 2023-09-05 ENCOUNTER — HOSPITAL ENCOUNTER (OUTPATIENT)
Dept: MAMMOGRAPHY | Facility: HOSPITAL | Age: 68
Discharge: HOME | End: 2023-09-05
Attending: INTERNAL MEDICINE
Payer: MEDICARE

## 2023-09-05 VITALS — WEIGHT: 125 LBS | BODY MASS INDEX: 18.51 KG/M2 | HEIGHT: 69 IN

## 2023-09-05 DIAGNOSIS — Z12.31 ENCOUNTER FOR SCREENING MAMMOGRAM FOR BREAST CANCER: ICD-10-CM

## 2023-09-05 PROCEDURE — 77063 BREAST TOMOSYNTHESIS BI: CPT

## 2023-09-05 RX ORDER — BUSPIRONE HYDROCHLORIDE 30 MG/1
TABLET ORAL
Qty: 60 TABLET | Refills: 3 | Status: SHIPPED | OUTPATIENT
Start: 2023-09-05

## 2023-09-07 ENCOUNTER — OFFICE VISIT (OUTPATIENT)
Dept: NEUROLOGY | Facility: CLINIC | Age: 68
End: 2023-09-07
Payer: MEDICARE

## 2023-09-07 VITALS
WEIGHT: 126.8 LBS | BODY MASS INDEX: 18.78 KG/M2 | HEIGHT: 69 IN | HEART RATE: 70 BPM | OXYGEN SATURATION: 99 % | DIASTOLIC BLOOD PRESSURE: 78 MMHG | SYSTOLIC BLOOD PRESSURE: 162 MMHG

## 2023-09-07 DIAGNOSIS — M79.605 LEG PAIN, LATERAL, LEFT: Primary | ICD-10-CM

## 2023-09-07 DIAGNOSIS — R20.2 PARESTHESIAS: ICD-10-CM

## 2023-09-07 DIAGNOSIS — Z79.899 CONTROLLED SUBSTANCE AGREEMENT SIGNED: ICD-10-CM

## 2023-09-07 DIAGNOSIS — I70.422 ATHEROSCLEROSIS OF AUTOLOGOUS VEIN BYPASS GRAFT OF LEFT LOWER EXTREMITY WITH REST PAIN: ICD-10-CM

## 2023-09-07 PROBLEM — I10 BENIGN HYPERTENSION: Status: ACTIVE | Noted: 2021-06-04

## 2023-09-07 PROBLEM — E03.9 HYPOTHYROIDISM: Status: ACTIVE | Noted: 2021-06-04

## 2023-09-07 PROBLEM — F17.200 TOBACCO USE DISORDER: Status: ACTIVE | Noted: 2023-04-20

## 2023-09-07 PROBLEM — E07.9 THYROID DISORDER: Status: ACTIVE | Noted: 2021-06-04

## 2023-09-07 PROBLEM — C02.9 SQUAMOUS CELL CANCER OF TONGUE: Status: ACTIVE | Noted: 2021-05-21

## 2023-09-07 PROBLEM — Z98.890 H/O THYROIDECTOMY: Status: ACTIVE | Noted: 2021-06-04

## 2023-09-07 PROBLEM — Z98.890 POSTOPERATIVE STATE: Status: ACTIVE | Noted: 2021-06-10

## 2023-09-07 PROBLEM — E89.0 H/O THYROIDECTOMY: Status: ACTIVE | Noted: 2021-06-04

## 2023-09-07 PROBLEM — E78.5 HYPERLIPIDEMIA: Status: ACTIVE | Noted: 2021-06-04

## 2023-09-07 PROBLEM — Z90.89 H/O THYROIDECTOMY: Status: ACTIVE | Noted: 2021-06-04

## 2023-09-07 PROCEDURE — 3077F SYST BP >= 140 MM HG: CPT | Performed by: NURSE PRACTITIONER

## 2023-09-07 PROCEDURE — 1160F RVW MEDS BY RX/DR IN RCRD: CPT | Performed by: NURSE PRACTITIONER

## 2023-09-07 PROCEDURE — 99204 OFFICE O/P NEW MOD 45 MIN: CPT | Performed by: NURSE PRACTITIONER

## 2023-09-07 PROCEDURE — 3078F DIAST BP <80 MM HG: CPT | Performed by: NURSE PRACTITIONER

## 2023-09-07 PROCEDURE — 1159F MED LIST DOCD IN RCRD: CPT | Performed by: NURSE PRACTITIONER

## 2023-09-07 RX ORDER — GABAPENTIN 100 MG/1
100 CAPSULE ORAL
COMMUNITY
Start: 2023-08-16 | End: 2023-09-07 | Stop reason: SDUPTHER

## 2023-09-07 RX ORDER — GABAPENTIN 100 MG/1
CAPSULE ORAL
Qty: 120 CAPSULE | Refills: 2 | Status: SHIPPED | OUTPATIENT
Start: 2023-09-07

## 2023-09-07 NOTE — LETTER
2023     Nino Bates III, MD  [address]  [address]  [facility] [address]    Patient: Katiana High   YOB: 1955   Date of Visit: 2023     Dear Nino Bates III, MD:       Thank you for referring Katiana High to me for evaluation. Below are the relevant portions of my assessment and plan of care.    If you have questions, please do not hesitate to call me. I look forward to following Katiana along with you.         Sincerely,        Jaydon Coley DNP, CALI        CC: No Recipients    Jaydon Coley DNP, APRN  [phone]  Signed     Neuro Office Visit      Encounter Date: 2023   Patient Name: Katiana High  : 1955   MRN: [id number]   PCP: Dr Hackett  Referring: Dr Bates  Chief Complaint:    Chief Complaint   Patient presents with   • Peripheral Neuropathy       History of Present Illness: Katiana High is a 68 y.o. female who is here today in Neurology w her sister for  LLE pain.    LLE pain  Onset  after surgery for tongue cancer. Pain described as toothache in the distrubution of  left lateral lower leg. In the beginning it was constant. Pain is constant but has intermittent burning. Worse with sitting too long and walking. Laying down can make it worse. Pain is achey. Pain  in left foot is  throbbing and left lateral lower leg feels hot. Left foot is numb with decreased sensation and symptoms of pins and needles. Keeps her awake at night.   Treated with tylenol and GBP with no relief. Took GBP 100mg at TID.  No change in bladder or bowels. No falls but has unsteady gait. Using a cane most of the time.    Denies etoh use. Has oral cancer and has no teeth or tongue. Has tongue autograft.  Has difficulty eating meat. Had chemo and radiation, sx worse after treatment.  Smoking at least a pack a day.  No history of diabetes. No FH of neuropathy.    Also had onset of low back pain after cancer and PVD. Back pain is right lower back.  "Constant gnawing pain after walking too much.  Had MRI and CT of back. Does not have results.    8/23/23  l-spine x-ray 5 lumbar-type vertebral bodies in anatomic alignment without fracture.   Mild-moderate degenerative disc disease worst at L5-S1. Facet hypertrophy at   L4-5 and L5 L5-S1, left greater than right.     Calcified splenic granulomas and aortoiliac calcification noted     Pt with known PVD s/p aortobifemoral graft with right borderline anastomosis stenosis. Complains of LLE pain more. Cardiology suspects sx maybe musculoskeletal or neuropathic. Taking asa, plavix, high intensity statin.  Known Carotid art disease monitored by cardiology w plans for repeat duplex 1-2 years.  Continued tob abuse.    Labs reviewed 2022-mag, folate, vit b12-nml    PMH: LBBB, PVD, s/p aortobifemoral bypass, carotid w 50-69% bilat stenosis, renal art stenosis.  SH: +tob, Was a nursing assist.  Subjective      Past Medical History:   Past Medical History:   Diagnosis Date   • Anxiety    • Hx of blood clots    • Thyroid disorder        Past Surgical History:   Past Surgical History:   Procedure Laterality Date   • CORONARY STENT PLACEMENT  03/01/2022   • TONGUE SURGERY      \"cancer surgry of tongue\"       Family History:   Family History   Problem Relation Age of Onset   • Cancer Mother    • Cancer Sister        Social History:   Social History     Socioeconomic History   • Marital status:    Tobacco Use   • Smoking status: Every Day     Packs/day: 1.00     Types: Cigarettes   Vaping Use   • Vaping Use: Never used   Substance and Sexual Activity   • Alcohol use: Not Currently   • Drug use: Never       Medications:     Current Outpatient Medications:   •  aspirin 81 MG EC tablet, Take 1 tablet by mouth Daily., Disp: , Rfl:   •  atenolol (TENORMIN) 100 MG tablet, Take 1 tablet by mouth Daily., Disp: , Rfl:   •  busPIRone (BUSPAR) 30 MG tablet, Take 1 tablet by mouth 2 (Two) Times a Day., Disp: , Rfl:   •  Calcium " Polycarbophil (FIBERCON PO), Take 1 tablet by mouth 2 (Two) Times a Day., Disp: , Rfl:   •  clopidogrel (PLAVIX) 75 MG tablet, TAKE 1 TABLET BY MOUTH DAILY., Disp: 90 tablet, Rfl: 3  •  ezetimibe (ZETIA) 10 MG tablet, TAKE 1 TABLET BY MOUTH DAILY., Disp: 30 tablet, Rfl: 3  •  gabapentin (NEURONTIN) 100 MG capsule, 1 tab in am,  1 tab 2pm, 2 tabs at 9pm, Disp: 120 capsule, Rfl: 2  •  hydroCHLOROthiazide (HYDRODIURIL) 25 MG tablet, Take 1 tablet by mouth Daily. Taking 12.5 PRN, Disp: , Rfl:   •  hydrOXYzine pamoate (VISTARIL) 50 MG capsule, Take 1 capsule by mouth Daily., Disp: , Rfl:   •  levothyroxine (SYNTHROID, LEVOTHROID) 50 MCG tablet, Take 1 tablet by mouth Daily., Disp: , Rfl:   •  pyridoxine (VITAMIN B-6) 100 MG tablet, Take 1 tablet by mouth Daily., Disp: , Rfl:   •  rosuvastatin (CRESTOR) 40 MG tablet, Take 1 tablet by mouth Daily., Disp: , Rfl:   •  vitamin D (ERGOCALCIFEROL) 1.25 MG (05204 UT) capsule capsule, Take 1 capsule by mouth 1 (One) Time Per Week., Disp: , Rfl:     Allergies:   No Known Allergies    PHQ-9 Total Score:     STEADI Fall Risk Assessment was completed, and patient is at LOW risk for falls.Assessment completed on:9/7/2023    Objective     Physical Exam:   Physical Exam  Eyes:      Pupils: Pupils are equal, round, and reactive to light.   Neurological:      Mental Status: She is oriented to person, place, and time.      Coordination: Finger-Nose-Finger Test, Heel to Shin Test and Romberg Test normal.      Gait: Gait is intact.      Deep Tendon Reflexes:      Reflex Scores:       Tricep reflexes are 2+ on the right side and 2+ on the left side.       Bicep reflexes are 2+ on the right side and 2+ on the left side.       Brachioradialis reflexes are 2+ on the right side and 2+ on the left side.       Patellar reflexes are 2+ on the right side and 2+ on the left side.       Achilles reflexes are 2+ on the right side and 2+ on the left side.  Psychiatric:         Speech: Speech normal.  "      Neurologic Exam     Mental Status   Oriented to person, place, and time.   Follows 3 step commands.   Attention: normal. Concentration: normal.   Speech: speech is normal   Level of consciousness: alert  Knowledge: consistent with education.   Normal comprehension.     Cranial Nerves     CN III, IV, VI   Pupils are equal, round, and reactive to light.  Right pupil: Accommodation: intact.   Left pupil: Accommodation: intact.   CN III: no CN III palsy  CN VI: no CN VI palsy  Nystagmus: none   Diplopia: none  Upgaze: normal  Downgaze: normal  Conjugate gaze: present    CN VII   Facial expression full, symmetric.     CN VIII   Hearing: intact    CN XII   CN XII normal.     Motor Exam   Muscle bulk: normal  Overall muscle tone: normal    Strength   Right biceps: 5/5  Left biceps: 5/5  Right triceps: 5/5  Left triceps: 5/5  Right interossei: 5/5  Left interossei: 5/5  Right quadriceps: 5/5  Left quadriceps: 5/5  Right anterior tibial: 5/5  Left anterior tibial: 5/5  Right posterior tibial: 5/5  Left posterior tibial: 5/5    Sensory Exam   Right arm light touch: normal  Left arm light touch: normal  Right leg light touch: normal  Left leg light touch: decreased from toes    Gait, Coordination, and Reflexes     Gait  Gait: normal    Coordination   Romberg: negative  Finger to nose coordination: normal  Heel to shin coordination: normal    Tremor   Resting tremor: absent  Action tremor: absent    Reflexes   Right brachioradialis: 2+  Left brachioradialis: 2+  Right biceps: 2+  Left biceps: 2+  Right triceps: 2+  Left triceps: 2+  Right patellar: 2+  Left patellar: 2+  Right achilles: 2+  Left achilles: 2+  Right : 2+  Left : 2+     Vital Signs:   Vitals:    09/07/23 1051   BP: 162/78   Pulse: 70   SpO2: 99%   Weight: 57.5 kg (126 lb 12.8 oz)   Height: 175.3 cm (69.02\")     Body mass index is 18.72 kg/m².       Assessment / Plan      Assessment/Plan:   Diagnoses and all orders for this visit:    1. Leg pain, " lateral, left (Primary)  -     EMG & Nerve Conduction Test; Future  -     gabapentin (NEURONTIN) 100 MG capsule; 1 tab in am,  1 tab 2pm, 2 tabs at 9pm  Dispense: 120 capsule; Refill: 2    2. Paresthesias  -     EMG & Nerve Conduction Test; Future  -     gabapentin (NEURONTIN) 100 MG capsule; 1 tab in am,  1 tab 2pm, 2 tabs at 9pm  Dispense: 120 capsule; Refill: 2    3. 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 Controlled substance agreement signed       Pt with multiple possible causes of pain and numbness with history of chem, radiation, PVD and lumbar spine pathology.  Will obtain EMG.   Obtain copy of MRI l-spine.      Patient Education:    As a part of this patient's therapy a controlled substance was prescribed. Instructed on the safe and proper use of this medication along with potential risks. Controlled substance contract signed and scanned. Joe will be reviewed and UDS obtained as indicated.      Reviewed medications, potential side effects and signs and symptoms to report. Discussed risk versus benefits of treatment plan with patient and/or family-including medications, labs and radiology that may be ordered. Addressed questions and concerns during visit. Patient and/or family verbalized understanding and agree with plan. Instructed to call the office with any questions and report to ER with any life-threatening symptoms.     Follow Up:   Return in about 4 months (around 1/7/2024) for Recheck.    During this visit the following were done:  Labs Reviewed [x]    Labs Ordered []    Radiology Reports Reviewed [x]    Radiology Ordered []    PCP Records Reviewed [x]    Referring Provider Records Reviewed [x]    ER Records Reviewed []    Hospital Records Reviewed []    History Obtained From Family []    Radiology Images Reviewed []    Other Reviewed [x]    Records Requested []      Jaydon Coley, DNP, APRN

## 2023-09-07 NOTE — PROGRESS NOTES
Neuro Office Visit      Encounter Date: 2023   Patient Name: Katiana High  : 1955   MRN: [id number]   PCP: Dr Hackett  Referring: Dr Bates  Chief Complaint:    Chief Complaint   Patient presents with    Peripheral Neuropathy       History of Present Illness: Katiana High is a 68 y.o. female who is here today in Neurology w her sister for  LLE pain.    LLE pain  Onset  after surgery for tongue cancer. Pain described as toothache in the distrubution of  left lateral lower leg. In the beginning it was constant. Pain is constant but has intermittent burning. Worse with sitting too long and walking. Laying down can make it worse. Pain is achey. Pain  in left foot is  throbbing and left lateral lower leg feels hot. Left foot is numb with decreased sensation and symptoms of pins and needles. Keeps her awake at night.   Treated with tylenol and GBP with no relief. Took GBP 100mg at TID.  No change in bladder or bowels. No falls but has unsteady gait. Using a cane most of the time.    Denies etoh use. Has oral cancer and has no teeth or tongue. Has tongue autograft.  Has difficulty eating meat. Had chemo and radiation, sx worse after treatment.  Smoking at least a pack a day.  No history of diabetes. No FH of neuropathy.    Also had onset of low back pain after cancer and PVD. Back pain is right lower back. Constant gnawing pain after walking too much.  Had MRI and CT of back. Does not have results.    23  l-spine x-ray 5 lumbar-type vertebral bodies in anatomic alignment without fracture.   Mild-moderate degenerative disc disease worst at L5-S1. Facet hypertrophy at   L4-5 and L5 L5-S1, left greater than right.     Calcified splenic granulomas and aortoiliac calcification noted     Pt with known PVD s/p aortobifemoral graft with right borderline anastomosis stenosis. Complains of LLE pain more. Cardiology suspects sx maybe musculoskeletal or neuropathic. Taking asa, plavix, high intensity  "statin.  Known Carotid art disease monitored by cardiology w plans for repeat duplex 1-2 years.  Continued tob abuse.    Labs reviewed 2022-mag, folate, vit b12-nml    PMH: LBBB, PVD, s/p aortobifemoral bypass, carotid w 50-69% bilat stenosis, renal art stenosis.  SH: +tob, Was a nursing assist.  Subjective      Past Medical History:   Past Medical History:   Diagnosis Date    Anxiety     Hx of blood clots     Thyroid disorder        Past Surgical History:   Past Surgical History:   Procedure Laterality Date    CORONARY STENT PLACEMENT  03/01/2022    TONGUE SURGERY      \"cancer surgry of tongue\"       Family History:   Family History   Problem Relation Age of Onset    Cancer Mother     Cancer Sister        Social History:   Social History     Socioeconomic History    Marital status:    Tobacco Use    Smoking status: Every Day     Packs/day: 1.00     Types: Cigarettes   Vaping Use    Vaping Use: Never used   Substance and Sexual Activity    Alcohol use: Not Currently    Drug use: Never       Medications:     Current Outpatient Medications:     aspirin 81 MG EC tablet, Take 1 tablet by mouth Daily., Disp: , Rfl:     atenolol (TENORMIN) 100 MG tablet, Take 1 tablet by mouth Daily., Disp: , Rfl:     busPIRone (BUSPAR) 30 MG tablet, Take 1 tablet by mouth 2 (Two) Times a Day., Disp: , Rfl:     Calcium Polycarbophil (FIBERCON PO), Take 1 tablet by mouth 2 (Two) Times a Day., Disp: , Rfl:     clopidogrel (PLAVIX) 75 MG tablet, TAKE 1 TABLET BY MOUTH DAILY., Disp: 90 tablet, Rfl: 3    ezetimibe (ZETIA) 10 MG tablet, TAKE 1 TABLET BY MOUTH DAILY., Disp: 30 tablet, Rfl: 3    gabapentin (NEURONTIN) 100 MG capsule, 1 tab in am,  1 tab 2pm, 2 tabs at 9pm, Disp: 120 capsule, Rfl: 2    hydroCHLOROthiazide (HYDRODIURIL) 25 MG tablet, Take 1 tablet by mouth Daily. Taking 12.5 PRN, Disp: , Rfl:     hydrOXYzine pamoate (VISTARIL) 50 MG capsule, Take 1 capsule by mouth Daily., Disp: , Rfl:     levothyroxine (SYNTHROID, " LEVOTHROID) 50 MCG tablet, Take 1 tablet by mouth Daily., Disp: , Rfl:     pyridoxine (VITAMIN B-6) 100 MG tablet, Take 1 tablet by mouth Daily., Disp: , Rfl:     rosuvastatin (CRESTOR) 40 MG tablet, Take 1 tablet by mouth Daily., Disp: , Rfl:     vitamin D (ERGOCALCIFEROL) 1.25 MG (25721 UT) capsule capsule, Take 1 capsule by mouth 1 (One) Time Per Week., Disp: , Rfl:     Allergies:   No Known Allergies    PHQ-9 Total Score:     STEADI Fall Risk Assessment was completed, and patient is at LOW risk for falls.Assessment completed on:9/7/2023    Objective     Physical Exam:   Physical Exam  Eyes:      Pupils: Pupils are equal, round, and reactive to light.   Neurological:      Mental Status: She is oriented to person, place, and time.      Coordination: Finger-Nose-Finger Test, Heel to Shin Test and Romberg Test normal.      Gait: Gait is intact.      Deep Tendon Reflexes:      Reflex Scores:       Tricep reflexes are 2+ on the right side and 2+ on the left side.       Bicep reflexes are 2+ on the right side and 2+ on the left side.       Brachioradialis reflexes are 2+ on the right side and 2+ on the left side.       Patellar reflexes are 2+ on the right side and 2+ on the left side.       Achilles reflexes are 2+ on the right side and 2+ on the left side.  Psychiatric:         Speech: Speech normal.       Neurologic Exam     Mental Status   Oriented to person, place, and time.   Follows 3 step commands.   Attention: normal. Concentration: normal.   Speech: speech is normal   Level of consciousness: alert  Knowledge: consistent with education.   Normal comprehension.     Cranial Nerves     CN III, IV, VI   Pupils are equal, round, and reactive to light.  Right pupil: Accommodation: intact.   Left pupil: Accommodation: intact.   CN III: no CN III palsy  CN VI: no CN VI palsy  Nystagmus: none   Diplopia: none  Upgaze: normal  Downgaze: normal  Conjugate gaze: present    CN VII   Facial expression full, symmetric.  "    CN VIII   Hearing: intact    CN XII   CN XII normal.     Motor Exam   Muscle bulk: normal  Overall muscle tone: normal    Strength   Right biceps: 5/5  Left biceps: 5/5  Right triceps: 5/5  Left triceps: 5/5  Right interossei: 5/5  Left interossei: 5/5  Right quadriceps: 5/5  Left quadriceps: 5/5  Right anterior tibial: 5/5  Left anterior tibial: 5/5  Right posterior tibial: 5/5  Left posterior tibial: 5/5    Sensory Exam   Right arm light touch: normal  Left arm light touch: normal  Right leg light touch: normal  Left leg light touch: decreased from toes    Gait, Coordination, and Reflexes     Gait  Gait: normal    Coordination   Romberg: negative  Finger to nose coordination: normal  Heel to shin coordination: normal    Tremor   Resting tremor: absent  Action tremor: absent    Reflexes   Right brachioradialis: 2+  Left brachioradialis: 2+  Right biceps: 2+  Left biceps: 2+  Right triceps: 2+  Left triceps: 2+  Right patellar: 2+  Left patellar: 2+  Right achilles: 2+  Left achilles: 2+  Right : 2+  Left : 2+     Vital Signs:   Vitals:    09/07/23 1051   BP: 162/78   Pulse: 70   SpO2: 99%   Weight: 57.5 kg (126 lb 12.8 oz)   Height: 175.3 cm (69.02\")     Body mass index is 18.72 kg/m².       Assessment / Plan      Assessment/Plan:   Diagnoses and all orders for this visit:    1. Leg pain, lateral, left (Primary)  -     EMG & Nerve Conduction Test; Future  -     gabapentin (NEURONTIN) 100 MG capsule; 1 tab in am,  1 tab 2pm, 2 tabs at 9pm  Dispense: 120 capsule; Refill: 2    2. Paresthesias  -     EMG & Nerve Conduction Test; Future  -     gabapentin (NEURONTIN) 100 MG capsule; 1 tab in am,  1 tab 2pm, 2 tabs at 9pm  Dispense: 120 capsule; Refill: 2    3. 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 Controlled substance agreement signed       Pt with multiple possible causes of pain and numbness with history of chem, radiation, PVD and lumbar spine " pathology.  Will obtain EMG.   Obtain copy of MRI l-spine.      Patient Education:    As a part of this patient's therapy a controlled substance was prescribed. Instructed on the safe and proper use of this medication along with potential risks. Controlled substance contract signed and scanned. Joe will be reviewed and UDS obtained as indicated.      Reviewed medications, potential side effects and signs and symptoms to report. Discussed risk versus benefits of treatment plan with patient and/or family-including medications, labs and radiology that may be ordered. Addressed questions and concerns during visit. Patient and/or family verbalized understanding and agree with plan. Instructed to call the office with any questions and report to ER with any life-threatening symptoms.     Follow Up:   Return in about 4 months (around 1/7/2024) for Recheck.    During this visit the following were done:  Labs Reviewed [x]    Labs Ordered []    Radiology Reports Reviewed [x]    Radiology Ordered []    PCP Records Reviewed [x]    Referring Provider Records Reviewed [x]    ER Records Reviewed []    Hospital Records Reviewed []    History Obtained From Family []    Radiology Images Reviewed []    Other Reviewed [x]    Records Requested []      Jaydon Coley, JAZMIN, APRN

## 2023-09-07 NOTE — LETTER
2023     Meron Hackett MD  [address] [place] [id number]    Patient: Katiana High   YOB: 1955   Date of Visit: 2023     Dear Meron Hackett MD:       Thank you for referring Katiana High to me for evaluation. Below are the relevant portions of my assessment and plan of care.    If you have questions, please do not hesitate to call me. I look forward to following Katiana along with you.         Sincerely,        Jaydon Coley DNP, CALI        CC: No Recipients    Jaydon Coley DNP, CALI  23 [id number]  Signed     Neuro Office Visit      Encounter Date: 2023   Patient Name: Katiana High  : 1955   MRN: [id number]   PCP: Dr Hackett  Referring: Dr Bates  Chief Complaint:    Chief Complaint   Patient presents with   • Peripheral Neuropathy       History of Present Illness: Katiana High is a 68 y.o. female who is here today in Neurology w her sister for  LLE pain.    LLE pain  Onset  after surgery for tongue cancer. Pain described as toothache in the distrubution of  left lateral lower leg. In the beginning it was constant. Pain is constant but has intermittent burning. Worse with sitting too long and walking. Laying down can make it worse. Pain is achey. Pain  in left foot is  throbbing and left lateral lower leg feels hot. Left foot is numb with decreased sensation and symptoms of pins and needles. Keeps her awake at night.   Treated with tylenol and GBP with no relief. Took GBP 100mg at [facility].  No change in bladder or bowels. No falls but has unsteady gait. Using a cane most of the time.    Denies etoh use. Has oral cancer and has no teeth or tongue. Has tongue autograft.  Has difficulty eating meat. Had chemo and radiation, sx worse after treatment.  Smoking at least a pack a day.  No history of diabetes. No FH of neuropathy.    Also had onset of low back pain after cancer and PVD. Back pain is right lower back. Constant gnawing pain after walking  "too much.  Had MRI and CT of back. Does not have results.    8/23/23  l-spine x-ray 5 lumbar-type vertebral bodies in anatomic alignment without fracture.   Mild-moderate degenerative disc disease worst at L5-S1. Facet hypertrophy at   L4-5 and L5 L5-S1, left greater than right.     Calcified splenic granulomas and aortoiliac calcification noted     Pt with known PVD s/p aortobifemoral graft with right borderline anastomosis stenosis. Complains of LLE pain more. Cardiology suspects sx maybe musculoskeletal or neuropathic. Taking asa, plavix, high intensity statin.  Known Carotid art disease monitored by cardiology w plans for repeat duplex 1-2 years.  Continued tob abuse.    Labs reviewed 2022-mag, folate, vit b12-nml    PMH: LBBB, PVD, s/p aortobifemoral bypass, carotid w 50-69% bilat stenosis, renal art stenosis.  SH: +tob, Was a nursing assist.  Subjective      Past Medical History:   Past Medical History:   Diagnosis Date   • Anxiety    • Hx of blood clots    • Thyroid disorder        Past Surgical History:   Past Surgical History:   Procedure Laterality Date   • CORONARY STENT PLACEMENT  03/01/2022   • TONGUE SURGERY      \"cancer surgry of tongue\"       Family History:   Family History   Problem Relation Age of Onset   • Cancer Mother    • Cancer Sister        Social History:   Social History     Socioeconomic History   • Marital status:    Tobacco Use   • Smoking status: Every Day     Packs/day: 1.00     Types: Cigarettes   Vaping Use   • Vaping Use: Never used   Substance and Sexual Activity   • Alcohol use: Not Currently   • Drug use: Never       Medications:     Current Outpatient Medications:   •  aspirin 81 MG EC tablet, Take 1 tablet by mouth Daily., Disp: , Rfl:   •  atenolol (TENORMIN) 100 MG tablet, Take 1 tablet by mouth Daily., Disp: , Rfl:   •  busPIRone (BUSPAR) 30 MG tablet, Take 1 tablet by mouth 2 (Two) Times a Day., Disp: , Rfl:   •  Calcium Polycarbophil (FIBERCON PO), Take 1 tablet by " mouth 2 (Two) Times a Day., Disp: , Rfl:   •  clopidogrel (PLAVIX) 75 MG tablet, TAKE 1 TABLET BY MOUTH DAILY., Disp: 90 tablet, Rfl: 3  •  ezetimibe (ZETIA) 10 MG tablet, TAKE 1 TABLET BY MOUTH DAILY., Disp: 30 tablet, Rfl: 3  •  gabapentin (NEURONTIN) 100 MG capsule, 1 tab in am,  1 tab 2pm, 2 tabs at 9pm, Disp: 120 capsule, Rfl: 2  •  hydroCHLOROthiazide (HYDRODIURIL) 25 MG tablet, Take 1 tablet by mouth Daily. Taking 12.5 PRN, Disp: , Rfl:   •  hydrOXYzine pamoate (VISTARIL) 50 MG capsule, Take 1 capsule by mouth Daily., Disp: , Rfl:   •  levothyroxine (SYNTHROID, LEVOTHROID) 50 MCG tablet, Take 1 tablet by mouth Daily., Disp: , Rfl:   •  pyridoxine (VITAMIN B-6) 100 MG tablet, Take 1 tablet by mouth Daily., Disp: , Rfl:   •  rosuvastatin (CRESTOR) 40 MG tablet, Take 1 tablet by mouth Daily., Disp: , Rfl:   •  vitamin D (ERGOCALCIFEROL) 1.25 MG (10230 UT) capsule capsule, Take 1 capsule by mouth 1 (One) Time Per Week., Disp: , Rfl:     Allergies:   No Known Allergies    PHQ-9 Total Score:     STEADI Fall Risk Assessment was completed, and patient is at LOW risk for falls.Assessment completed on:9/7/2023    Objective     Physical Exam:   Physical Exam  Eyes:      Pupils: Pupils are equal, round, and reactive to light.   Neurological:      Mental Status: She is oriented to person, place, and time.      Coordination: Finger-Nose-Finger Test, Heel to Shin Test and Romberg Test normal.      Gait: Gait is intact.      Deep Tendon Reflexes:      Reflex Scores:       Tricep reflexes are 2+ on the right side and 2+ on the left side.       Bicep reflexes are 2+ on the right side and 2+ on the left side.       Brachioradialis reflexes are 2+ on the right side and 2+ on the left side.       Patellar reflexes are 2+ on the right side and 2+ on the left side.       Achilles reflexes are 2+ on the right side and 2+ on the left side.  Psychiatric:         Speech: Speech normal.       Neurologic Exam     Mental Status   Oriented  "to person, place, and time.   Follows 3 step commands.   Attention: normal. Concentration: normal.   Speech: speech is normal   Level of consciousness: alert  Knowledge: consistent with education.   Normal comprehension.     Cranial Nerves     CN III, IV, VI   Pupils are equal, round, and reactive to light.  Right pupil: Accommodation: intact.   Left pupil: Accommodation: intact.   CN III: no CN III palsy  CN VI: no CN VI palsy  Nystagmus: none   Diplopia: none  Upgaze: normal  Downgaze: normal  Conjugate gaze: present    CN VII   Facial expression full, symmetric.     CN VIII   Hearing: intact    CN XII   CN XII normal.     Motor Exam   Muscle bulk: normal  Overall muscle tone: normal    Strength   Right biceps: 5/5  Left biceps: 5/5  Right triceps: 5/5  Left triceps: 5/5  Right interossei: 5/5  Left interossei: 5/5  Right quadriceps: 5/5  Left quadriceps: 5/5  Right anterior tibial: 5/5  Left anterior tibial: 5/5  Right posterior tibial: 5/5  Left posterior tibial: 5/5    Sensory Exam   Right arm light touch: normal  Left arm light touch: normal  Right leg light touch: normal  Left leg light touch: decreased from toes    Gait, Coordination, and Reflexes     Gait  Gait: normal    Coordination   Romberg: negative  Finger to nose coordination: normal  Heel to shin coordination: normal    Tremor   Resting tremor: absent  Action tremor: absent    Reflexes   Right brachioradialis: 2+  Left brachioradialis: 2+  Right biceps: 2+  Left biceps: 2+  Right triceps: 2+  Left triceps: 2+  Right patellar: 2+  Left patellar: 2+  Right achilles: 2+  Left achilles: 2+  Right : 2+  Left : 2+     Vital Signs:   Vitals:    09/07/23 1051   BP: 162/78   Pulse: 70   SpO2: 99%   Weight: 57.5 kg (126 lb 12.8 oz)   Height: 175.3 cm (69.02\")     Body mass index is 18.72 kg/m².       Assessment / Plan      Assessment/Plan:   Diagnoses and all orders for this visit:    1. Leg pain, lateral, left (Primary)  -     EMG & Nerve Conduction " Test; Future  -     gabapentin (NEURONTIN) 100 MG capsule; 1 tab in am,  1 tab 2pm, 2 tabs at 9pm  Dispense: 120 capsule; Refill: 2    2. Paresthesias  -     EMG & Nerve Conduction Test; Future  -     gabapentin (NEURONTIN) 100 MG capsule; 1 tab in am,  1 tab 2pm, 2 tabs at 9pm  Dispense: 120 capsule; Refill: 2    3. 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 Controlled substance agreement signed       Pt with multiple possible causes of pain and numbness with history of chem, radiation, PVD and lumbar spine pathology.  Will obtain EMG.   Obtain copy of MRI l-spine.      Patient Education:    As a part of this patient's therapy a controlled substance was prescribed. Instructed on the safe and proper use of this medication along with potential risks. Controlled substance contract signed and scanned. Joe will be reviewed and UDS obtained as indicated.      Reviewed medications, potential side effects and signs and symptoms to report. Discussed risk versus benefits of treatment plan with patient and/or family-including medications, labs and radiology that may be ordered. Addressed questions and concerns during visit. Patient and/or family verbalized understanding and agree with plan. Instructed to call the office with any questions and report to ER with any life-threatening symptoms.     Follow Up:   Return in about 4 months (around 1/7/2024) for Recheck.    During this visit the following were done:  Labs Reviewed [x]    Labs Ordered []    Radiology Reports Reviewed [x]    Radiology Ordered []    PCP Records Reviewed [x]    Referring Provider Records Reviewed [x]    ER Records Reviewed []    Hospital Records Reviewed []    History Obtained From Family []    Radiology Images Reviewed []    Other Reviewed [x]    Records Requested []      Jaydon Coley, DNP, APRN

## 2023-09-14 ENCOUNTER — TELEPHONE (OUTPATIENT)
Dept: NEUROLOGY | Facility: CLINIC | Age: 68
End: 2023-09-14
Payer: MEDICARE

## 2023-09-14 ENCOUNTER — DOCUMENTATION (OUTPATIENT)
Dept: NEUROLOGY | Facility: CLINIC | Age: 68
End: 2023-09-14
Payer: MEDICARE

## 2023-09-14 ENCOUNTER — PROCEDURE VISIT (OUTPATIENT)
Dept: NEUROLOGY | Facility: CLINIC | Age: 68
End: 2023-09-14
Payer: MEDICARE

## 2023-09-14 DIAGNOSIS — G60.3 IDIOPATHIC PROGRESSIVE NEUROPATHY: Primary | ICD-10-CM

## 2023-09-14 NOTE — PROGRESS NOTES
[facility]   Electrodiagnostic Laboratory    Nerve Conduction & EMG Report        Patient:   Katiana High   Patient ID: [id number]   YOB: 1955  Sex:   female      Exam Physician:  Pb Vieyra MD  Refer Physician:  Jaydon LEIVA    Electromyogram and Nerve Conduction Velocity Procedure Note    Hx: 68 y.o. right handed female with complaint of pain involving the left leg. Symptoms have been present for several years and were provoked by after surgery for tongue cancer. Significant past medical history includes lumbar radiculopathy.  Family history no family history of nerve or muscle disease.    Exam: Motor power is normal. There is no atrophy. There are no fasciculations. Deep tendon reflexes are present and symmetrical. Sensory exam decreased pp and lt below knees.       Edx studies of the B LE were performed to evaluate for peripheral neuropathy.     NCS Examination   For sensory nerve conduction studies, the amplitude is measured peak-to-peak, the latency reported is the distal peak latency, and the conduction velocity, if measured, is determined from onset latencies and is over the forearm.   For motor nerve conduction studies, the amplitude is measured baseline-to-peak, the latency reported is the distal onset latency, the conduction velocity is calculated over the forearm, and the F wave latency is the minimum latency.   Unless otherwise noted, the hand temperature was monitored continuously and remained between 32°C and 36°C during the performance of the NCSs.      Nerve Conduction Studies  Anti Sensory Summary Table     Stim Site NR Norm Peak (ms) O-P Amp (µV) Norm O-P Amp Onset (ms) Site1 Site2 Delta-0 (ms) Dist (cm) Wali (m/s) Norm Wali (m/s)   Left Sup Fibular Anti Sensory (Ant Lat Mall)   14 cm *NR <4.4  >5.0  14 cm Ant Lat Mall  14.0     Right Sup Fibular Anti Sensory (Ant Lat Mall)   14 cm *NR <4.4  >5.0  14 cm Ant Lat Mall  14.0     Left Sural Anti Sensory  (Lat Mall)   Calf *NR <4.0  >5.0  Calf Lat Mall  14.0     Right Sural Anti Sensory (Lat Mall)   Calf *NR <4.0  >5.0  Calf Lat Mall  14.0       Motor Summary Table     Stim Site NR Onset (ms) Norm Onset (ms) O-P Amp (mV) Norm O-P Amp Site1 Site2 Delta-0 (ms) Dist (cm) Wali (m/s) Norm Wali (m/s)   Left Fibular Motor (Ext Dig Brev)   Ankle *NR  <6.1  >2.5         Right Fibular Motor (Ext Dig Brev)   Ankle    5.2 <6.1 2.5 >2.5 B Fib Ankle 8.1 30.0 *37 >40   B Fib    13.3  2.1  Poplt B Fib 3.1 8.0 *26 >40   Poplt    16.4  1.4          Left Tibial Motor (Abd Gibbs Brev)   Ankle    4.8 <6.1 *0.3 >3.0 Knee Ankle 12.8 41.0 *32 >40   Knee    17.6  0.1          Right Tibial Motor (Abd Gibbs Brev)   Ankle    5.0 <6.1 *2.3 >3.0 Knee Ankle 13.0 41.0 *32 >40   Knee    18.0  0.3            F Wave Studies     NR F-Lat (ms) Lat Norm (ms) L-R F-Lat (ms) L-R Lat Norm   Right Fibular (Mrkrs) (EDB)   *NR  <60  <5.1   Left Tibial (Mrkrs) (Abd Hallucis)   *NR  <61  <5.7   Right Tibial (Mrkrs) (Abd Hallucis)   *NR  <61  <5.7     H Reflex Studies     NR H-Lat (ms) L-R H-Lat (ms) L-R Lat Norm   Left Tibial (Mrkrs) (Gastroc)      39.38 0.00 <2.0   Right Tibial (Mrkrs) (Gastroc)      39.38 0.00 <2.0         EMG Examination   The study was performed with a concentric needle electrode. Fibrillation and fasciculation activity is graded from none (0) to continuous (4+). The configuration and recruitment pattern of motor unit action potentials under voluntary control, if not normal, are described below     Side Muscle Nerve Root Ins Act Fibs Psw Amp Dur Poly Recrt Int Pat Comment   Right AntTibialis Dp Br Fibular L4-5 *Incr *1+ *1+ *Incr *>12ms *1+ *Reduced *50%    Right Gastroc Tibial S1-2 *Incr *1+ *1+ *Incr *>12ms *1+ *Reduced *50%    Right BicepsFemL Sciatic L5-S2 Nml Nml Nml Nml Nml 0 Nml Nml    Right VastusMed Femoral L2-4 Nml Nml Nml Nml Nml 0 Nml Nml    Right Iliopsoas Femoral L2-3 Nml Nml Nml Nml Nml 0 Nml Nml    Left AntTibialis Dp Br Fibular  L4-5 *Incr *1+ *1+ *Incr *>12ms *1+ *Reduced *50%    Left Gastroc Tibial S1-2 *Incr *1+ *1+ *Incr *>12ms *1+ *Reduced *50%    Left VastusMed Femoral L2-4 Nml Nml Nml Nml Nml 0 Nml Nml    Left BicepsFemL Sciatic L5-S2 Nml Nml Nml Nml Nml 0 Nml Nml    Left Iliopsoas Femoral L2-3 Nml Nml Nml Nml Nml 0 Nml Nml        Nerve Conduction Studies  Anti Sensory Left/Right Comparison     Stim Site L Lat (ms) R Lat (ms) L-R Lat (ms) L Amp (µV) R Amp (µV) L-R Amp (%) Site1 Site2 L Wali (m/s) R Wali (m/s) L-R Wali (m/s)   Sup Fibular Anti Sensory (Ant Lat Mall)   14 cm       14 cm Ant Lat Mall      Sural Anti Sensory (Lat Mall)   Calf       Calf Lat Mall        Motor Left/Right Comparison     Stim Site L Lat (ms) R Lat (ms) L-R Lat (ms) L Amp (mV) R Amp (mV) L-R Amp (%) Site1 Site2 L Wali (m/s) R Wali (m/s) L-R Wali (m/s)   Fibular Motor (Ext Dig Brev)   Ankle  5.2   2.5         B Fib  13.3   2.1         Poplt  16.4   1.4         Tibial Motor (Abd Gibbs Brev)   Ankle 4.8 5.0 0.2 *0.3 *2.3 *87.0 Knee Ankle *32 *32 0   Knee 17.6 18.0 0.4 0.1 0.3 66.7              Waveforms:                                                  NCV FINDINGS:  Evaluation of the left Fibular motor nerve showed no response (Ankle).  The right Fibular motor nerve showed decreased conduction velocity (B Fib-Ankle, 37 m/s) and decreased conduction velocity (Poplt-B Fib, 26 m/s).  The left tibial motor and the right tibial motor nerves showed reduced amplitude (L0.3, R2.3 mV) and decreased conduction velocity (Knee-Ankle, L32, R32 m/s).  The left Sup Fibular sensory and the right Sup Fibular sensory nerves showed no response (14 cm).  The left sural sensory and the right sural sensory nerves showed no response (Calf).  Left vs. Right side comparison data for the tibial motor nerve indicates abnormal L-R amplitude difference (87.0 %).  F Wave studies indicate that the right Fibular F wave has no response.  The left tibial F wave has no response.  The right tibial F  wave has no response.  All H Reflex left vs. right side latency differences were within normal limits..    EMG FINDINGS:  Needle evaluation of the right anterior tibialis, the right gastroc, the left anterior tibialis, and the left gastroc muscles showed increased insertional activity, slightly increased spontaneous activity, increased motor unit amplitude, increased motor unit duration, slightly increased polyphasic potentials, diminished recruitment, and decreased interference pattern.  All remaining muscles (as indicated in the following table) showed no evidence of electrical instability.    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          Instrument used:  Teca Synergy        Performed by:          Pb Vieyra MD

## 2023-09-14 NOTE — TELEPHONE ENCOUNTER
Caller: Katiana High    Relationship: Self    Best call back number:   Telephone Information:   Mobile [phone]     What is the best time to reach you: ANYTIME    Who are you requesting to speak with (clinical staff, provider,  specific staff member): CLINICAL    Do you know the name of the person who called: NEELAM    What was the call regarding: RESULTS

## 2023-09-14 NOTE — TELEPHONE ENCOUNTER
Called patient and gave results.  Patient was understanding and appreciative.    ----- Message from Jaydon Coley DNP, APRN sent at 9/14/2023 11:49 AM EDT -----  Regarding: EMG  Please call pt. EMG showed severe polyneuropathy or nerve damage. This is likely due to chemotherapy. Continue Gabapentin. Will discuss more at next visit.

## 2023-09-14 NOTE — TELEPHONE ENCOUNTER
Called patient and could not leave  due to inbox not being set up.    ----- Message from Jaydon Coley DNP, APRN sent at 9/14/2023 11:49 AM EDT -----  Regarding: EMG  Please call pt. EMG showed severe polyneuropathy or nerve damage. This is likely due to chemotherapy. Continue Gabapentin. Will discuss more at next visit.    
I have reviewed and confirmed nurses' notes...

## 2023-09-14 NOTE — TELEPHONE ENCOUNTER
Called patient and could not leave vm.  Second attempt.    ----- Message from Jaydon Coley DNP, CALI sent at 9/14/2023 11:49 AM EDT -----  Regarding: EMG  Please call pt. EMG showed severe polyneuropathy or nerve damage. This is likely due to chemotherapy. Continue Gabapentin. Will discuss more at next visit.

## 2023-09-14 NOTE — LETTER
September 14, 2023     Jaydon Coley DNP, CALI  [address]  [address]  [facility] [address]    Patient: Katiana High   YOB: 1955   Date of Visit: 9/14/2023     Dear Jaydon Coley DNP, APRN:       Thank you for referring Katiana High to me for evaluation. Below are the relevant portions of my assessment and plan of care.    If you have questions, please do not hesitate to call me. I look forward to following Katiana along with you.         Sincerely,        Pb Vieyra MD        CC: No Recipients    Pb Vieyra MD  09/14/23 1053  Sign when Signing Visit      [facility]   Electrodiagnostic Laboratory    Nerve Conduction & EMG Report        Patient:   Katiana High   Patient ID: [id number]   YOB: 1955  Sex:   female      Exam Physician:  Pb Vieyra MD  Refer Physician:  Jaydon LEIVA    Electromyogram and Nerve Conduction Velocity Procedure Note    Hx: 68 y.o. right handed female with complaint of pain involving the left leg. Symptoms have been present for several years and were provoked by after surgery for tongue cancer. Significant past medical history includes lumbar radiculopathy.  Family history no family history of nerve or muscle disease.    Exam: Motor power is normal. There is no atrophy. There are no fasciculations. Deep tendon reflexes are present and symmetrical. Sensory exam decreased pp and lt below knees.       Edx studies of the B LE were performed to evaluate for peripheral neuropathy.     NCS Examination   For sensory nerve conduction studies, the amplitude is measured peak-to-peak, the latency reported is the distal peak latency, and the conduction velocity, if measured, is determined from onset latencies and is over the forearm.   For motor nerve conduction studies, the amplitude is measured baseline-to-peak, the latency reported is the distal onset latency, the conduction velocity is calculated over the  forearm, and the F wave latency is the minimum latency.   Unless otherwise noted, the hand temperature was monitored continuously and remained between 32°C and 36°C during the performance of the NCSs.      Nerve Conduction Studies  Anti Sensory Summary Table     Stim Site NR Norm Peak (ms) O-P Amp (µV) Norm O-P Amp Onset (ms) Site1 Site2 Delta-0 (ms) Dist (cm) Wali (m/s) Norm Wali (m/s)   Left Sup Fibular Anti Sensory (Ant Lat Mall)   14 cm *NR <4.4  >5.0  14 cm Ant Lat Mall  14.0     Right Sup Fibular Anti Sensory (Ant Lat Mall)   14 cm *NR <4.4  >5.0  14 cm Ant Lat Mall  14.0     Left Sural Anti Sensory (Lat Mall)   Calf *NR <4.0  >5.0  Calf Lat Mall  14.0     Right Sural Anti Sensory (Lat Mall)   Calf *NR <4.0  >5.0  Calf Lat Mall  14.0       Motor Summary Table     Stim Site NR Onset (ms) Norm Onset (ms) O-P Amp (mV) Norm O-P Amp Site1 Site2 Delta-0 (ms) Dist (cm) Wali (m/s) Norm Wali (m/s)   Left Fibular Motor (Ext Dig Brev)   Ankle *NR  <6.1  >2.5         Right Fibular Motor (Ext Dig Brev)   Ankle    5.2 <6.1 2.5 >2.5 B Fib Ankle 8.1 30.0 *37 >40   B Fib    13.3  2.1  Poplt B Fib 3.1 8.0 *26 >40   Poplt    16.4  1.4          Left Tibial Motor (Abd Gibbs Brev)   Ankle    4.8 <6.1 *0.3 >3.0 Knee Ankle 12.8 41.0 *32 >40   Knee    17.6  0.1          Right Tibial Motor (Abd Gibbs Brev)   Ankle    5.0 <6.1 *2.3 >3.0 Knee Ankle 13.0 41.0 *32 >40   Knee    18.0  0.3            F Wave Studies     NR F-Lat (ms) Lat Norm (ms) L-R F-Lat (ms) L-R Lat Norm   Right Fibular (Mrkrs) (EDB)   *NR  <60  <5.1   Left Tibial (Mrkrs) (Abd Hallucis)   *NR  <61  <5.7   Right Tibial (Mrkrs) (Abd Hallucis)   *NR  <61  <5.7     H Reflex Studies     NR H-Lat (ms) L-R H-Lat (ms) L-R Lat Norm   Left Tibial (Mrkrs) (Gastroc)      39.38 0.00 <2.0   Right Tibial (Mrkrs) (Gastroc)      39.38 0.00 <2.0         EMG Examination   The study was performed with a concentric needle electrode. Fibrillation and fasciculation activity is graded from none (0) to  continuous (4+). The configuration and recruitment pattern of motor unit action potentials under voluntary control, if not normal, are described below     Side Muscle Nerve Root Ins Act Fibs Psw Amp Dur Poly Recrt Int Pat Comment   Right AntTibialis Dp Br Fibular L4-5 *Incr *1+ *1+ *Incr *>12ms *1+ *Reduced *50%    Right Gastroc Tibial S1-2 *Incr *1+ *1+ *Incr *>12ms *1+ *Reduced *50%    Right BicepsFemL Sciatic L5-S2 Nml Nml Nml Nml Nml 0 Nml Nml    Right VastusMed Femoral L2-4 Nml Nml Nml Nml Nml 0 Nml Nml    Right Iliopsoas Femoral L2-3 Nml Nml Nml Nml Nml 0 Nml Nml    Left AntTibialis Dp Br Fibular L4-5 *Incr *1+ *1+ *Incr *>12ms *1+ *Reduced *50%    Left Gastroc Tibial S1-2 *Incr *1+ *1+ *Incr *>12ms *1+ *Reduced *50%    Left VastusMed Femoral L2-4 Nml Nml Nml Nml Nml 0 Nml Nml    Left BicepsFemL Sciatic L5-S2 Nml Nml Nml Nml Nml 0 Nml Nml    Left Iliopsoas Femoral L2-3 Nml Nml Nml Nml Nml 0 Nml Nml        Nerve Conduction Studies  Anti Sensory Left/Right Comparison     Stim Site L Lat (ms) R Lat (ms) L-R Lat (ms) L Amp (µV) R Amp (µV) L-R Amp (%) Site1 Site2 L Wali (m/s) R Wali (m/s) L-R Wali (m/s)   Sup Fibular Anti Sensory (Ant Lat Mall)   14 cm       14 cm Ant Lat Mall      Sural Anti Sensory (Lat Mall)   Calf       Calf Lat Mall        Motor Left/Right Comparison     Stim Site L Lat (ms) R Lat (ms) L-R Lat (ms) L Amp (mV) R Amp (mV) L-R Amp (%) Site1 Site2 L Wali (m/s) R Wali (m/s) L-R Wali (m/s)   Fibular Motor (Ext Dig Brev)   Ankle  5.2   2.5         B Fib  13.3   2.1         Poplt  16.4   1.4         Tibial Motor (Abd Gibbs Brev)   Ankle 4.8 5.0 0.2 *0.3 *2.3 *87.0 Knee Ankle *32 *32 0   Knee 17.6 18.0 0.4 0.1 0.3 66.7              Waveforms:                                                  NCV FINDINGS:  Evaluation of the left Fibular motor nerve showed no response (Ankle).  The right Fibular motor nerve showed decreased conduction velocity (B Fib-Ankle, 37 m/s) and decreased conduction velocity (Poplt-B  Fib, 26 m/s).  The left tibial motor and the right tibial motor nerves showed reduced amplitude (L0.3, R2.3 mV) and decreased conduction velocity (Knee-Ankle, L32, R32 m/s).  The left Sup Fibular sensory and the right Sup Fibular sensory nerves showed no response (14 cm).  The left sural sensory and the right sural sensory nerves showed no response (Calf).  Left vs. Right side comparison data for the tibial motor nerve indicates abnormal L-R amplitude difference (87.0 %).  F Wave studies indicate that the right Fibular F wave has no response.  The left tibial F wave has no response.  The right tibial F wave has no response.  All H Reflex left vs. right side latency differences were within normal limits..    EMG FINDINGS:  Needle evaluation of the right anterior tibialis, the right gastroc, the left anterior tibialis, and the left gastroc muscles showed increased insertional activity, slightly increased spontaneous activity, increased motor unit amplitude, increased motor unit duration, slightly increased polyphasic potentials, diminished recruitment, and decreased interference pattern.  All remaining muscles (as indicated in the following table) showed no evidence of electrical instability.    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          Instrument used:  [employer] Synergy        Performed by:          Pb Vieyra MD

## 2023-09-25 RX ORDER — HYDROCHLOROTHIAZIDE 12.5 MG/1
12.5 TABLET ORAL DAILY PRN
Qty: 30 TABLET | Refills: 1 | Status: SHIPPED | OUTPATIENT
Start: 2023-09-25

## 2023-10-05 RX ORDER — HYDROXYZINE PAMOATE 50 MG/1
50 CAPSULE ORAL NIGHTLY PRN
Qty: 30 CAPSULE | Refills: 3 | Status: SHIPPED | OUTPATIENT
Start: 2023-10-05

## 2023-10-12 ENCOUNTER — OFFICE VISIT (OUTPATIENT)
Dept: PRIMARY CARE CLINIC | Age: 68
End: 2023-10-12
Payer: MEDICARE

## 2023-10-12 VITALS
HEART RATE: 66 BPM | RESPIRATION RATE: 16 BRPM | HEIGHT: 69 IN | BODY MASS INDEX: 18.66 KG/M2 | DIASTOLIC BLOOD PRESSURE: 56 MMHG | TEMPERATURE: 98 F | WEIGHT: 126 LBS | OXYGEN SATURATION: 96 % | SYSTOLIC BLOOD PRESSURE: 116 MMHG

## 2023-10-12 DIAGNOSIS — Z11.59 NEED FOR HEPATITIS C SCREENING TEST: ICD-10-CM

## 2023-10-12 DIAGNOSIS — E03.9 HYPOTHYROIDISM, UNSPECIFIED TYPE: ICD-10-CM

## 2023-10-12 DIAGNOSIS — Z00.00 INITIAL MEDICARE ANNUAL WELLNESS VISIT: Primary | ICD-10-CM

## 2023-10-12 DIAGNOSIS — Z23 NEED FOR PROPHYLACTIC VACCINATION AGAINST DIPHTHERIA-TETANUS-PERTUSSIS (DTP): ICD-10-CM

## 2023-10-12 DIAGNOSIS — Z23 NEED FOR INFLUENZA VACCINATION: ICD-10-CM

## 2023-10-12 DIAGNOSIS — Z87.891 PERSONAL HISTORY OF TOBACCO USE, PRESENTING HAZARDS TO HEALTH: ICD-10-CM

## 2023-10-12 DIAGNOSIS — E78.5 HYPERLIPIDEMIA, UNSPECIFIED HYPERLIPIDEMIA TYPE: ICD-10-CM

## 2023-10-12 DIAGNOSIS — Z13.31 POSITIVE DEPRESSION SCREENING: ICD-10-CM

## 2023-10-12 PROCEDURE — G0008 ADMIN INFLUENZA VIRUS VAC: HCPCS | Performed by: INTERNAL MEDICINE

## 2023-10-12 PROCEDURE — 3078F DIAST BP <80 MM HG: CPT | Performed by: INTERNAL MEDICINE

## 2023-10-12 PROCEDURE — 1123F ACP DISCUSS/DSCN MKR DOCD: CPT | Performed by: INTERNAL MEDICINE

## 2023-10-12 PROCEDURE — 90694 VACC AIIV4 NO PRSRV 0.5ML IM: CPT | Performed by: INTERNAL MEDICINE

## 2023-10-12 PROCEDURE — 3074F SYST BP LT 130 MM HG: CPT | Performed by: INTERNAL MEDICINE

## 2023-10-12 PROCEDURE — 99406 BEHAV CHNG SMOKING 3-10 MIN: CPT | Performed by: INTERNAL MEDICINE

## 2023-10-12 PROCEDURE — G0438 PPPS, INITIAL VISIT: HCPCS | Performed by: INTERNAL MEDICINE

## 2023-10-12 RX ORDER — DULOXETIN HYDROCHLORIDE 20 MG/1
20 CAPSULE, DELAYED RELEASE ORAL DAILY
Qty: 30 CAPSULE | Refills: 1 | Status: SHIPPED | OUTPATIENT
Start: 2023-10-12

## 2023-10-12 ASSESSMENT — PATIENT HEALTH QUESTIONNAIRE - PHQ9
7. TROUBLE CONCENTRATING ON THINGS, SUCH AS READING THE NEWSPAPER OR WATCHING TELEVISION: 1
SUM OF ALL RESPONSES TO PHQ QUESTIONS 1-9: 11
2. FEELING DOWN, DEPRESSED OR HOPELESS: 2
6. FEELING BAD ABOUT YOURSELF - OR THAT YOU ARE A FAILURE OR HAVE LET YOURSELF OR YOUR FAMILY DOWN: 0
4. FEELING TIRED OR HAVING LITTLE ENERGY: 2
10. IF YOU CHECKED OFF ANY PROBLEMS, HOW DIFFICULT HAVE THESE PROBLEMS MADE IT FOR YOU TO DO YOUR WORK, TAKE CARE OF THINGS AT HOME, OR GET ALONG WITH OTHER PEOPLE: 1
SUM OF ALL RESPONSES TO PHQ9 QUESTIONS 1 & 2: 4
SUM OF ALL RESPONSES TO PHQ QUESTIONS 1-9: 11
3. TROUBLE FALLING OR STAYING ASLEEP: 2
1. LITTLE INTEREST OR PLEASURE IN DOING THINGS: 2
9. THOUGHTS THAT YOU WOULD BE BETTER OFF DEAD, OR OF HURTING YOURSELF: 0
SUM OF ALL RESPONSES TO PHQ QUESTIONS 1-9: 11
5. POOR APPETITE OR OVEREATING: 2
SUM OF ALL RESPONSES TO PHQ QUESTIONS 1-9: 11
8. MOVING OR SPEAKING SO SLOWLY THAT OTHER PEOPLE COULD HAVE NOTICED. OR THE OPPOSITE, BEING SO FIGETY OR RESTLESS THAT YOU HAVE BEEN MOVING AROUND A LOT MORE THAN USUAL: 0

## 2023-10-12 ASSESSMENT — LIFESTYLE VARIABLES: HOW OFTEN DO YOU HAVE A DRINK CONTAINING ALCOHOL: NEVER

## 2023-10-12 NOTE — PROGRESS NOTES
Chief Complaint   Patient presents with    Medicare AWV       Have you seen any other physician or provider since your last visit yes - Neurology    Have you had any other diagnostic tests since your last visit? yes - mammogram    Have you changed or stopped any medications since your last visit? no     Vaccine Information Sheet, \"Influenza - Inactivated\"  given to [place], or parent/legal guardian of  [place] and verbalized understanding. Patient responses:    Have you ever had a reaction to a flu vaccine? No  Do you have any current illness? No  Have you ever had Guillian Gully Syndrome? No  Do you have a serious allergy to any of the follow: Neomycin, Polymyxin, Thimerosal, eggs or egg products? No    Flu vaccine given per order. Please see immunization tab. Risks and benefits explained. Current VIS given.
PHQ-9 score today: (PHQ-9 Total Score: 11), additional evaluation and assessment performed, follow-up plan includes but not limited to: Medication management and Referral to /Specialist  for evaluation and management.
vaccine  Aged Out    Hepatitis B vaccine  Aged Out    Hib vaccine  Aged Out    Meningococcal (ACWY) vaccine  Aged Out    Depression Screen  Discontinued     Recommendations for [employer] Due: see orders and patient instructions/AVS.    EKG reviewed 10-27-22    I did discuss cardiovascular risk with patient. Patient with known PVD but no documented CAD. Encouraged to continue following up with cardiology. she is on appropriate regimen. Make sure blood pressure, and lipid profile under good control. Discussed the importance of increased activity level/staying active. Discussed the importance of smoking cessation. Recommended screening schedule for the next 5-10 years is provided to the patient in written form: see Patient Instructions/AVS.    PHQ-9 score today: (PHQ-9 Total Score: 11), additional evaluation and assessment performed, follow-up plan includes but not limited to: Medication management and Referral to /Specialist  for evaluation and management. Start on Cymbalta. I had a long discussion with her regarding the risk of smoking especialy with her other medical problems. I have discussed with patient several treatment options (program, nicotine product and other meds like Chantix, Wellbutrin ect) to help her quit smoking. Patient is not interested at this time. Spent 4 minutes in counseling regarding smoking cessation. Dejah Mota 2024  Lengthy conversation with the patient regarding the importance of exercising on a daily basis and on a regular basis. This will help reduce stress and help with her strength/endurance. Lengthy conversation regarding safety tips at home including railing, nonslip mat and removing any possible thing that could be a fall risk out of her way. Lengthy conversation regarding the risk from falling and potential complication in her age group.     Christy Khanna LPN am scribing for and in the presence of Suzan Elizalde MD on this date 10/12/23 at 10:09

## 2023-11-06 DIAGNOSIS — R20.2 PARESTHESIAS: ICD-10-CM

## 2023-11-06 DIAGNOSIS — M79.605 LEG PAIN, LATERAL, LEFT: ICD-10-CM

## 2023-11-06 RX ORDER — ROSUVASTATIN CALCIUM 40 MG/1
40 TABLET, COATED ORAL DAILY
Qty: 90 TABLET | Refills: 1 | Status: SHIPPED | OUTPATIENT
Start: 2023-11-06

## 2023-11-06 RX ORDER — ATENOLOL 100 MG/1
100 TABLET ORAL DAILY
Qty: 90 TABLET | Refills: 1 | Status: SHIPPED | OUTPATIENT
Start: 2023-11-06

## 2023-11-06 RX ORDER — GABAPENTIN 100 MG/1
CAPSULE ORAL
Qty: 120 CAPSULE | Refills: 2 | Status: SHIPPED | OUTPATIENT
Start: 2023-11-06

## 2023-11-06 RX ORDER — LEVOTHYROXINE SODIUM 0.05 MG/1
50 TABLET ORAL DAILY
Qty: 90 TABLET | Refills: 1 | Status: SHIPPED | OUTPATIENT
Start: 2023-11-06

## 2023-11-06 RX ORDER — HYDROXYZINE PAMOATE 50 MG/1
50 CAPSULE ORAL NIGHTLY PRN
Qty: 30 CAPSULE | Refills: 3 | Status: SHIPPED | OUTPATIENT
Start: 2023-11-06

## 2023-11-06 NOTE — TELEPHONE ENCOUNTER
Rx Refill Note  Requested Prescriptions     Pending Prescriptions Disp Refills    gabapentin (NEURONTIN) 100 MG capsule [Pharmacy Med Name: GABAPENTIN 100 MG CAPS 100 Capsule] 120 capsule 2     Sig: TAKE ONE CAPSULE IN THE MORNING TAKE ONE CAPSULE AT TAKE 2 CAPSULES BY MOUTH ONCE NIGHTLY TAKE TWO CAPSULES AT 9PM      Last filled:  09/07/2023 w/2  Last office visit with prescribing clinician: 9/7/2023      Next office visit with prescribing clinician: 1/9/2024     Eve Walton MA  11/06/23, 10:27 EST

## 2023-11-13 RX ORDER — DULOXETIN HYDROCHLORIDE 20 MG/1
20 CAPSULE, DELAYED RELEASE ORAL DAILY
Qty: 30 CAPSULE | Refills: 1 | Status: SHIPPED | OUTPATIENT
Start: 2023-11-13

## 2023-11-20 RX ORDER — EZETIMIBE 10 MG/1
10 TABLET ORAL DAILY
Qty: 30 TABLET | Refills: 1 | Status: SHIPPED | OUTPATIENT
Start: 2023-11-20

## 2023-11-27 RX ORDER — ERGOCALCIFEROL 1.25 MG/1
50000 CAPSULE ORAL WEEKLY
Qty: 12 CAPSULE | Refills: 1 | Status: SHIPPED | OUTPATIENT
Start: 2023-11-27

## 2023-12-19 ENCOUNTER — OFFICE VISIT (OUTPATIENT)
Dept: CARDIOLOGY | Facility: CLINIC | Age: 68
End: 2023-12-19
Payer: MEDICARE

## 2023-12-19 VITALS
SYSTOLIC BLOOD PRESSURE: 104 MMHG | BODY MASS INDEX: 18.96 KG/M2 | OXYGEN SATURATION: 97 % | DIASTOLIC BLOOD PRESSURE: 50 MMHG | HEIGHT: 69 IN | WEIGHT: 128 LBS | HEART RATE: 71 BPM

## 2023-12-19 DIAGNOSIS — E78.2 MIXED HYPERLIPIDEMIA: ICD-10-CM

## 2023-12-19 DIAGNOSIS — I65.23 BILATERAL CAROTID ARTERY STENOSIS: ICD-10-CM

## 2023-12-19 DIAGNOSIS — I10 BENIGN HYPERTENSION: Primary | ICD-10-CM

## 2023-12-19 DIAGNOSIS — I70.0 ATHEROSCLEROSIS OF AORTA: ICD-10-CM

## 2023-12-19 DIAGNOSIS — I73.9 PVD (PERIPHERAL VASCULAR DISEASE) WITH CLAUDICATION: ICD-10-CM

## 2023-12-19 DIAGNOSIS — I44.7 LBBB (LEFT BUNDLE BRANCH BLOCK): ICD-10-CM

## 2023-12-19 PROCEDURE — 3078F DIAST BP <80 MM HG: CPT | Performed by: INTERNAL MEDICINE

## 2023-12-19 PROCEDURE — 3074F SYST BP LT 130 MM HG: CPT | Performed by: INTERNAL MEDICINE

## 2023-12-19 PROCEDURE — 99214 OFFICE O/P EST MOD 30 MIN: CPT | Performed by: INTERNAL MEDICINE

## 2023-12-19 RX ORDER — HYDROCHLOROTHIAZIDE 12.5 MG/1
1 TABLET ORAL DAILY PRN
COMMUNITY
Start: 2023-09-25

## 2023-12-19 RX ORDER — ERGOCALCIFEROL 1.25 MG/1
1 CAPSULE ORAL WEEKLY
COMMUNITY
Start: 2023-11-27

## 2023-12-19 NOTE — PROGRESS NOTES
[facility] Cardiology  Office visit  Katiana High  1955  [phone]  There is no work phone number on file.    VISIT DATE:  12/19/2023    PCP: Meron Hackett MD  [address] [place] [id number]    CC:  Chief Complaint   Patient presents with    PVD (peripheral vascular disease) with claudication    Dizziness       Previous cardiac studies and procedures:  June 2021 myocardial perfusion imaging  · Lexiscan Stress myocardial perfusion scan within normal limits.   · No evidence of ischemia.   · No evidence of prior myocardial injury.   · Left ventricular systolic function c/w LBBB ( LVEF 45% ).   March 2022: Aortobifemoral bypass.  April 2022 ABIs: Right 0.66.  Left 0.53.    November 2022  TTE   Ejection fraction is visually estimated to be 55-60 %.    Diastolic filling parameters suggests grade I diastolic dysfunction .   Bilateral carotid duplex  50 to 69% bilaterally  *  Spectral analysis of the Right internal carotid artery reveals peak systolic velocity 234 cm/s with end-diastolic velocity of 37 cm/s.   *  Spectral analysis of the Left internal carotid demonstrates peak systolic velocity of 197 cm/s with end-diastolic velocity 45 cm/s.   CTA abdominal aorta with runoffs bilaterally  1. Patent aortobifemoral bypass graft, with a questionable borderline  angiographically significant stenosis of the right femoral anastomosis.  Correlation with duplex bypass graft scanning may prove useful here.  2. Multilevel bilateral infrainguinal arterial occlusive disease, worse  on the right.  3. Moderate grade ostial stenosis, right main renal artery. Recommend  clinical correlation.    ASSESSMENT:   Diagnosis Plan   1. Benign hypertension        2. Bilateral carotid artery stenosis        3. Mixed hyperlipidemia        4. LBBB (left bundle branch block)        5. PVD (peripheral vascular disease) with claudication              PLAN:  Peripheral vascular disease: New onset atypical  right groin claudication, repeat CTA pending.  Continue aspirin, clopidogrel, high intensity statin therapy and afterload reduction.  Encourage continued regular exercise.    Suspect right innominate artery stenosis based on exam, currently asymptomatic.    Left bundle branch block with associated dyspnea on exertion: Echo revealing normal myocardial structure and function.    Bilateral carotid artery stenosis, moderate: Continue aspirin and high intensity statin therapy.  Duplex surveillance every 1 to 2 years.     Hypertension: Well-controlled at home.  Goal blood pressure less than 130/80 mmHg.  Continue current medical therapy.     Hyperlipidemia: Goal LDL less than 70.  Continue rosuvastatin 40 mg p.o. daily and Zetia 10 mg p.o. daily.     Nicotine abuse: Counseled on need for smoking cessation.  She is currently not ready to quit.      Subjective  Interval assessment: No change in baseline functional capacity.  Continues to smoke.  Blood pressures running less than 140/90 mmHg.  She is compliant with medical therapy.  Limiting bilateral lower extremity claudication, right greater than left and a class II pattern.  Recent onset right groin discomfort with ambulation and nocturnally when she moves over in bed in the wrong direction.  Associated right femoral artery bruit.    Initial evaluation: 67-year-old female, active smoker, a pack to 2 packs/day for the past 50 years.  History of peripheral vascular disease, left bundle branch block, oral cancer status postsurgical resection, dyslipidemia and hypertension.  She reports her blood pressures at home are less than 130/80 mmHg.  She describes weakness in her legs bilaterally with such activities as walking up stairs or walking up an incline.  Left sided weakness is worse than right.  Compliant with medical therapy.  She describes having a lower extremity peripheral vascular intervention which was not successful and then her follow-up aortobifemoral bypass in  March of this year.  Both procedures [facility].    PHYSICAL EXAMINATION:  Vitals:    12/19/23 1000   BP: 104/50   BP Location: Right arm   Patient Position: Sitting   Pulse: 71   SpO2: 97%   Weight: 58.1 kg (128 lb)   Height: 175.3 cm (69\")     General Appearance:    Alert, cooperative, no distress, appears stated age   Head:    Normocephalic, without obvious abnormality, atraumatic   Eyes:    conjunctiva/corneas clear   Nose:   Nares normal, septum midline, mucosa normal, no drainage   Throat:   Lips, teeth and gums normal   Neck:   Supple, symmetrical, trachea midline, bilateral carotid bruits.   Lungs:    Diminished throughout bilaterally, respirations unlabored   Chest Wall:    No tenderness or deformity, bruit overlying the right mid clavicular region.    Heart:    Regular rate and rhythm, S1 and S2 normal, no murmur, rub   or gallop, normal carotid impulse bilaterally without bruit.   Abdomen:     Soft, non-tender   Extremities:   Extremities normal, atraumatic, no cyanosis or edema   Pulses:   2+ and symmetric all extremities.  Right femoral artery bruit   Skin:   Skin color, texture, turgor normal, no rashes or lesions       Diagnostic Data:  Procedures  Lab Results   Component Value Date    CHLPL 130 08/02/2023    TRIG 153 08/02/2023    HDL 45 08/02/2023     Lab Results   Component Value Date    CREATININE 0.80 11/21/2022    K 2.7 (C) 06/10/2021     No results found for: \"HGBA1C\"  Lab Results   Component Value Date    WBC 7.5 08/02/2023    HGB 14.5 08/02/2023    HCT 44.6 08/02/2023     08/02/2023       Allergies  No Known Allergies    Current Medications    Current Outpatient Medications:     aspirin 81 MG EC tablet, Take 1 tablet by mouth Daily., Disp: , Rfl:     atenolol (TENORMIN) 100 MG tablet, Take 1 tablet by mouth Daily., Disp: , Rfl:     busPIRone (BUSPAR) 30 MG tablet, Take 1 tablet by mouth 2 (Two) Times a Day., Disp: , Rfl:     Calcium Polycarbophil " FIBERCON PO), Take 1 tablet by mouth 2 (Two) Times a Day., Disp: , Rfl:     clopidogrel (PLAVIX) 75 MG tablet, TAKE 1 TABLET BY MOUTH DAILY., Disp: 90 tablet, Rfl: 3    ezetimibe (ZETIA) 10 MG tablet, TAKE 1 TABLET BY MOUTH DAILY., Disp: 30 tablet, Rfl: 1    gabapentin (NEURONTIN) 100 MG capsule, TAKE ONE CAPSULE IN THE MORNING TAKE ONE CAPSULE AT TAKE 2 CAPSULES BY MOUTH ONCE NIGHTLY TAKE TWO CAPSULES AT 9PM (Patient taking differently: TAKE ONE CAPSULE IN THE MORNING 1 TABLET AT 2 PM AND 3 TABLETS AT BEDTIME), Disp: 120 capsule, Rfl: 2    hydroCHLOROthiazide (HYDRODIURIL) 12.5 MG tablet, Take 1 tablet by mouth Daily As Needed., Disp: , Rfl:     hydrOXYzine pamoate (VISTARIL) 50 MG capsule, Take 1 capsule by mouth Daily., Disp: , Rfl:     levothyroxine (SYNTHROID, LEVOTHROID) 50 MCG tablet, Take 1 tablet by mouth Daily., Disp: , Rfl:     pyridoxine (VITAMIN B-6) 100 MG tablet, Take 1 tablet by mouth Daily., Disp: , Rfl:     rosuvastatin (CRESTOR) 40 MG tablet, Take 1 tablet by mouth Daily., Disp: , Rfl:     vitamin D (ERGOCALCIFEROL) 1.25 MG (84640 UT) capsule capsule, Take 1 capsule by mouth 1 (One) Time Per Week., Disp: , Rfl:           ROS  ROS      SOCIAL HX  Social History     Socioeconomic History    Marital status:    Tobacco Use    Smoking status: Every Day     Packs/day: 1     Types: Cigarettes    Smokeless tobacco: Never   Vaping Use    Vaping Use: Never used   Substance and Sexual Activity    Alcohol use: Not Currently    Drug use: Never    Sexual activity: Defer       FAMILY HX  Family History   Problem Relation Age of Onset    Cancer Mother     Cancer Sister              Nino Bates III, MD, FACC       transcription errors may be present          Richi Keller MD  07/13/22 [id number]       Richi Keller MD  07/13/22 [id number]

## 2023-12-28 ENCOUNTER — HOSPITAL ENCOUNTER (OUTPATIENT)
Dept: CT IMAGING | Facility: HOSPITAL | Age: 68
Discharge: HOME | End: 2023-12-28
Payer: MEDICARE

## 2023-12-28 DIAGNOSIS — I73.9 PVD (PERIPHERAL VASCULAR DISEASE) (HCC): ICD-10-CM

## 2023-12-28 LAB
BUN SERPL-MCNC: 16 MG/DL (ref 6–20)
CREAT SERPL-MCNC: 0.9 MG/DL (ref 0.4–1.2)
GFR SERPLBLD CREATININE-BSD FMLA CKD-EPI: >60 ML/MIN/{1.73_M2}

## 2023-12-28 PROCEDURE — 6360000004 HC RX CONTRAST MEDICATION: Performed by: INTERNAL MEDICINE

## 2023-12-28 PROCEDURE — 82565 ASSAY OF CREATININE: CPT

## 2023-12-28 PROCEDURE — 75635 CT ANGIO ABDOMINAL ARTERIES: CPT

## 2023-12-28 PROCEDURE — 84520 ASSAY OF UREA NITROGEN: CPT

## 2023-12-28 PROCEDURE — 36415 COLL VENOUS BLD VENIPUNCTURE: CPT

## 2023-12-28 RX ORDER — EZETIMIBE 10 MG/1
10 TABLET ORAL DAILY
Qty: 90 TABLET | Refills: 0 | Status: SHIPPED | OUTPATIENT
Start: 2023-12-28

## 2023-12-28 RX ADMIN — IOPAMIDOL 100 ML: 755 INJECTION, SOLUTION INTRAVENOUS at 10:00

## 2023-12-28 NOTE — TELEPHONE ENCOUNTER
Lab Results   Component Value Date    CHLPL 130 08/02/2023    TRIG 153 08/02/2023    HDL 45 08/02/2023    LDL 54 08/02/2023     Lab Results   Component Value Date    CREATININE 0.80 11/21/2022    K 2.7 (C) 06/10/2021    CALCIUM 8.9 06/17/2021

## 2024-01-02 ENCOUNTER — TELEPHONE (OUTPATIENT)
Dept: NEUROLOGY | Facility: CLINIC | Age: 69
End: 2024-01-02
Payer: MEDICARE

## 2024-01-02 ENCOUNTER — TELEPHONE (OUTPATIENT)
Dept: CARDIOLOGY | Facility: CLINIC | Age: 69
End: 2024-01-02
Payer: MEDICARE

## 2024-01-02 NOTE — TELEPHONE ENCOUNTER
Pharmacy Name:      [facility] [address] [facility] - [phone] [facility] [phone] (Pharmacy)     Pharmacy representative name: DANICA    Pharmacy representative phone number: [phone]    What medication are you calling in regards to: GABAPENTIN    What question does the pharmacy have: NEED CLARIFICATION ON DOSING.

## 2024-01-09 ENCOUNTER — LAB (OUTPATIENT)
Dept: LAB | Facility: HOSPITAL | Age: 69
End: 2024-01-09
Payer: MEDICARE

## 2024-01-09 ENCOUNTER — OFFICE VISIT (OUTPATIENT)
Dept: NEUROLOGY | Facility: CLINIC | Age: 69
End: 2024-01-09
Payer: MEDICARE

## 2024-01-09 VITALS
OXYGEN SATURATION: 98 % | SYSTOLIC BLOOD PRESSURE: 162 MMHG | WEIGHT: 128 LBS | HEIGHT: 69 IN | BODY MASS INDEX: 18.96 KG/M2 | HEART RATE: 66 BPM | DIASTOLIC BLOOD PRESSURE: 64 MMHG

## 2024-01-09 DIAGNOSIS — Z79.899 CONTROLLED SUBSTANCE AGREEMENT SIGNED: ICD-10-CM

## 2024-01-09 DIAGNOSIS — C02.9 SQUAMOUS CELL CANCER OF TONGUE: ICD-10-CM

## 2024-01-09 DIAGNOSIS — G62.9 NEUROPATHY: Primary | ICD-10-CM

## 2024-01-09 DIAGNOSIS — G62.9 NEUROPATHY: ICD-10-CM

## 2024-01-09 LAB
ALBUMIN SERPL-MCNC: 5 G/DL (ref 3.5–5.2)
ALBUMIN/GLOB SERPL: 2.3 G/DL
ALP SERPL-CCNC: 68 U/L (ref 39–117)
ALT SERPL W P-5'-P-CCNC: 25 U/L (ref 1–33)
ANION GAP SERPL CALCULATED.3IONS-SCNC: 13 MMOL/L (ref 5–15)
AST SERPL-CCNC: 22 U/L (ref 1–32)
BILIRUB SERPL-MCNC: 0.2 MG/DL (ref 0–1.2)
BUN SERPL-MCNC: 14 MG/DL (ref 8–23)
BUN/CREAT SERPL: 14.7 (ref 7–25)
CALCIUM SPEC-SCNC: 10.1 MG/DL (ref 8.6–10.5)
CHLORIDE SERPL-SCNC: 105 MMOL/L (ref 98–107)
CO2 SERPL-SCNC: 24 MMOL/L (ref 22–29)
CREAT SERPL-MCNC: 0.95 MG/DL (ref 0.57–1)
CRP SERPL-MCNC: <0.3 MG/DL (ref 0–0.5)
EGFRCR SERPLBLD CKD-EPI 2021: 65.4 ML/MIN/1.73
FOLATE SERPL-MCNC: >20 NG/ML (ref 4.78–24.2)
GLOBULIN UR ELPH-MCNC: 2.2 GM/DL
GLUCOSE SERPL-MCNC: 93 MG/DL (ref 65–99)
POTASSIUM SERPL-SCNC: 4 MMOL/L (ref 3.5–5.2)
PROT SERPL-MCNC: 7.2 G/DL (ref 6–8.5)
SODIUM SERPL-SCNC: 142 MMOL/L (ref 136–145)
VIT B12 BLD-MCNC: 1005 PG/ML (ref 211–946)

## 2024-01-09 PROCEDURE — 86235 NUCLEAR ANTIGEN ANTIBODY: CPT

## 2024-01-09 PROCEDURE — 86225 DNA ANTIBODY NATIVE: CPT

## 2024-01-09 PROCEDURE — 99214 OFFICE O/P EST MOD 30 MIN: CPT | Performed by: NURSE PRACTITIONER

## 2024-01-09 PROCEDURE — 36415 COLL VENOUS BLD VENIPUNCTURE: CPT

## 2024-01-09 PROCEDURE — 86038 ANTINUCLEAR ANTIBODIES: CPT

## 2024-01-09 PROCEDURE — 3078F DIAST BP <80 MM HG: CPT | Performed by: NURSE PRACTITIONER

## 2024-01-09 PROCEDURE — 82595 ASSAY OF CRYOGLOBULIN: CPT

## 2024-01-09 PROCEDURE — 86431 RHEUMATOID FACTOR QUANT: CPT

## 2024-01-09 PROCEDURE — 86140 C-REACTIVE PROTEIN: CPT

## 2024-01-09 PROCEDURE — 86334 IMMUNOFIX E-PHORESIS SERUM: CPT

## 2024-01-09 PROCEDURE — 82746 ASSAY OF FOLIC ACID SERUM: CPT

## 2024-01-09 PROCEDURE — 86364 TISS TRNSGLTMNASE EA IG CLAS: CPT

## 2024-01-09 PROCEDURE — 82175 ASSAY OF ARSENIC: CPT

## 2024-01-09 PROCEDURE — 80053 COMPREHEN METABOLIC PANEL: CPT

## 2024-01-09 PROCEDURE — 86231 EMA EACH IG CLASS: CPT

## 2024-01-09 PROCEDURE — 84165 PROTEIN E-PHORESIS SERUM: CPT

## 2024-01-09 PROCEDURE — 3077F SYST BP >= 140 MM HG: CPT | Performed by: NURSE PRACTITIONER

## 2024-01-09 PROCEDURE — 86200 CCP ANTIBODY: CPT

## 2024-01-09 PROCEDURE — 83825 ASSAY OF MERCURY: CPT

## 2024-01-09 PROCEDURE — 82784 ASSAY IGA/IGD/IGG/IGM EACH: CPT

## 2024-01-09 PROCEDURE — 83655 ASSAY OF LEAD: CPT

## 2024-01-09 PROCEDURE — 82570 ASSAY OF URINE CREATININE: CPT

## 2024-01-09 PROCEDURE — 1159F MED LIST DOCD IN RCRD: CPT | Performed by: NURSE PRACTITIONER

## 2024-01-09 PROCEDURE — 82300 ASSAY OF CADMIUM: CPT

## 2024-01-09 PROCEDURE — 82607 VITAMIN B-12: CPT

## 2024-01-09 PROCEDURE — 86617 LYME DISEASE ANTIBODY: CPT

## 2024-01-09 PROCEDURE — 1160F RVW MEDS BY RX/DR IN RCRD: CPT | Performed by: NURSE PRACTITIONER

## 2024-01-09 RX ORDER — GABAPENTIN 300 MG/1
300 CAPSULE ORAL 3 TIMES DAILY
Qty: 270 CAPSULE | Refills: 1 | Status: SHIPPED | OUTPATIENT
Start: 2024-01-09

## 2024-01-09 NOTE — PROGRESS NOTES
Neuro Office Visit      Encounter Date: 2024   Patient Name: Katiana High  : 1955   MRN: [id number]   PCP: Dr Hackett  Chief Complaint:    Chief Complaint   Patient presents with    Leg pain       History of Present Illness: Katiana High is a 68 y.o. 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             LLE pain  Taking  tid and pain is severe. Only sleeping 2 hours. Requests to allow PCP to prescribe medication. It is a 2 hour trip to our office and they do not like driving in city traffic.  PH  Onset  after surgery for tongue cancer. Pain described as toothache in the distrubution of  left lateral lower leg. In the beginning it was constant. Pain is constant but has intermittent burning. Worse with sitting too long and walking. Laying down can make it worse. Pain is achey. Pain  in left foot is  throbbing and left lateral lower leg feels hot. Left foot is numb with decreased sensation and symptoms of pins and needles. Keeps her awake at night.   Treated with tylenol and GBP with no relief. Took GBP 100mg at TID.  No change in bladder or bowels. No falls but has unsteady gait. Using a cane most of the time.     Denies etoh use. Has oral cancer and has no teeth or tongue. Has tongue autograft.  Has difficulty eating meat. Had chemo and radiation, sx worse after treatment.  Smoking at least a pack a day.  No history of diabetes. No FH of neuropathy.     Also had onset of low back pain after cancer and PVD. Back pain is right lower back. Constant gnawing pain after walking too much.  Had MRI and CT of back. Does not have results.     23  l-spine x-ray 5 lumbar-type vertebral bodies in anatomic  "alignment without fracture.   Mild-moderate degenerative disc disease worst at L5-S1. Facet hypertrophy at   L4-5 and L5 L5-S1, left greater than right.     Calcified splenic granulomas and aortoiliac calcification noted      Pt with known PVD s/p aortobifemoral graft with right borderline anastomosis stenosis. Complains of LLE pain more. Cardiology suspects sx maybe musculoskeletal or neuropathic. Taking asa, plavix, high intensity statin.  Known Carotid art disease monitored by cardiology w plans for repeat duplex 1-2 years.  Continued tob abuse.     Labs reviewed 2022-mag, folate, vit b12-nml     PMH: LBBB, PVD, s/p aortobifemoral bypass, carotid w 50-69% bilat stenosis, renal art stenosis.  SH: +tob, Was a nursing assist.  Subjective      Past Medical History:   Past Medical History:   Diagnosis Date    Anxiety     Hx of blood clots     Thyroid disorder        Past Surgical History:   Past Surgical History:   Procedure Laterality Date    CORONARY STENT PLACEMENT  03/01/2022    TONGUE SURGERY      \"cancer surgry of tongue\"       Family History:   Family History   Problem Relation Age of Onset    Cancer Mother     Cancer Sister        Social History:   Social History     Socioeconomic History    Marital status:    Tobacco Use    Smoking status: Every Day     Packs/day: 1     Types: Cigarettes    Smokeless tobacco: Never   Vaping Use    Vaping Use: Never used   Substance and Sexual Activity    Alcohol use: Not Currently    Drug use: Never    Sexual activity: Defer       Medications:     Current Outpatient Medications:     aspirin 81 MG EC tablet, Take 1 tablet by mouth Daily., Disp: , Rfl:     atenolol (TENORMIN) 100 MG tablet, Take 1 tablet by mouth Daily., Disp: , Rfl:     busPIRone (BUSPAR) 30 MG tablet, Take 1 tablet by mouth 2 (Two) Times a Day., Disp: , Rfl:     clopidogrel (PLAVIX) 75 MG tablet, TAKE 1 TABLET BY MOUTH DAILY., Disp: 90 tablet, Rfl: 3    ezetimibe (ZETIA) 10 MG tablet, Take 1 tablet by " mouth Daily. Needs liver function test for further refills., Disp: 90 tablet, Rfl: 0    hydroCHLOROthiazide (HYDRODIURIL) 12.5 MG tablet, Take 1 tablet by mouth Daily As Needed., Disp: , Rfl:     hydrOXYzine pamoate (VISTARIL) 50 MG capsule, Take 1 capsule by mouth Daily., Disp: , Rfl:     levothyroxine (SYNTHROID, LEVOTHROID) 50 MCG tablet, Take 1 tablet by mouth Daily., Disp: , Rfl:     pyridoxine (VITAMIN B-6) 100 MG tablet, Take 1 tablet by mouth Daily., Disp: , Rfl:     rosuvastatin (CRESTOR) 40 MG tablet, Take 1 tablet by mouth Daily., Disp: , Rfl:     vitamin D (ERGOCALCIFEROL) 1.25 MG (31329 UT) capsule capsule, Take 1 capsule by mouth 1 (One) Time Per Week., Disp: , Rfl:     Calcium Polycarbophil (FIBERCON PO), Take 1 tablet by mouth 2 (Two) Times a Day. (Patient not taking: Reported on 1/9/2024), Disp: , Rfl:     gabapentin (NEURONTIN) 300 MG capsule, Take 1 capsule by mouth 3 (Three) Times a Day., Disp: 270 capsule, Rfl: 1    Allergies:   No Known Allergies    PHQ-9 Total Score:     STEADI Fall Risk Assessment was completed, and patient is at LOW risk for falls.Assessment completed on:1/9/2024    Objective     Physical Exam:   Physical Exam  Neurological:      Mental Status: She is oriented to person, place, and time.      Gait: Gait is intact.      Deep Tendon Reflexes:      Reflex Scores:       Bicep reflexes are 2+ on the right side and 2+ on the left side.       Brachioradialis reflexes are 2+ on the right side and 2+ on the left side.       Patellar reflexes are 1+ on the right side and 1+ on the left side.       Achilles reflexes are 1+ on the right side and 1+ on the left side.  Psychiatric:         Speech: Speech normal.         Neurologic Exam     Mental Status   Oriented to person, place, and time.   Follows 3 step commands.   Attention: normal. Concentration: normal.   Speech: speech is normal   Level of consciousness: alert  Knowledge: consistent with education.   Normal comprehension.  "    Cranial Nerves     CN III, IV, VI   CN III: no CN III palsy  CN VI: no CN VI palsy  Nystagmus: none   Upgaze: normal  Downgaze: normal  Conjugate gaze: present    Motor Exam   Muscle bulk: normal  Overall muscle tone: normal    Strength   Right biceps: 5/5  Left biceps: 5/5  Right triceps: 5/5  Left triceps: 5/5  Right interossei: 5/5  Left interossei: 5/5  Right quadriceps: 5/5  Left quadriceps: 5/5  Right anterior tibial: 5/5  Left anterior tibial: 5/5  Right posterior tibial: 5/5  Left posterior tibial: 5/5    Sensory Exam   Right arm light touch: normal  Left arm light touch: normal  Right leg light touch: decreased from toes  Left leg light touch: decreased from toes    Gait, Coordination, and Reflexes     Gait  Gait: normal    Tremor   Resting tremor: absent  Action tremor: absent    Reflexes   Right brachioradialis: 2+  Left brachioradialis: 2+  Right biceps: 2+  Left biceps: 2+  Right patellar: 1+  Left patellar: 1+  Right achilles: 1+  Left achilles: 1+  Right : 2+  Left : 2+       Vital Signs:   Vitals:    01/09/24 0910   BP: 162/64   Pulse: 66   SpO2: 98%   Weight: 58.1 kg (128 lb)   Height: 175.3 cm (69.02\")     Body mass index is 18.89 kg/m².          Assessment / Plan      Assessment/Plan:   Diagnoses and all orders for this visit:    1. Neuropathy (Primary)  -     Celiac Disease Panel; Future  -     Comprehensive Metabolic Panel; Future  -     C-reactive Protein; Future  -     Cryoglobulin; Future  -     Heavy Metals Profile II, Urine - Urine, Clean Catch; Future  -     CHRISTEN + PE; Future  -     Lyme Disease, Line Blot; Future  -     Rheumatoid Arthritis (RA) Profile; Future  -     Vitamin B12 & Folate; Future  -     JARED by IFA, Reflex 9-biomarkers profile; Future  -     MRI Brain With & Without Contrast; Future  -     gabapentin (NEURONTIN) 300 MG capsule; Take 1 capsule by mouth 3 (Three) Times a Day.  Dispense: 270 capsule; Refill: 1    2. Squamous cell cancer of tongue  -     Celiac " Disease Panel; Future  -     Comprehensive Metabolic Panel; Future  -     C-reactive Protein; Future  -     Cryoglobulin; Future  -     Heavy Metals Profile II, Urine - Urine, Clean Catch; Future  -     CHRISTEN + PE; Future  -     Lyme Disease, Line Blot; Future  -     Rheumatoid Arthritis (RA) Profile; Future  -     Vitamin B12 & Folate; Future  -     JARED by IFA, Reflex 9-biomarkers profile; Future  -     MRI Brain With & Without Contrast; Future    3. Controlled substance agreement signed           Patient Education:    As a part of this patient's therapy a controlled substance was prescribed. Instructed on the safe and proper use of this medication along with potential risks. Controlled substance contract signed and scanned. Joe will be reviewed and UDS obtained as indicated.      Reviewed medications, potential side effects and signs and symptoms to report. Discussed risk versus benefits of treatment plan with patient and/or family-including medications, labs and radiology that may be ordered. Addressed questions and concerns during visit. Patient and/or family verbalized understanding and agree with plan. Instructed to call the office with any questions and report to ER with any life-threatening symptoms.     Follow Up:   Return if symptoms worsen or fail to improve.    During this visit the following were done:  Labs Reviewed [x]    Labs Ordered [x]    Radiology Reports Reviewed []    Radiology Ordered [x]    PCP Records Reviewed []    Referring Provider Records Reviewed []    ER Records Reviewed []    Hospital Records Reviewed []    History Obtained From Family [x]    Radiology Images Reviewed []    Other Reviewed [x]    Records Requested []      Jaydon Coley, DNP, APRN

## 2024-01-09 NOTE — LETTER
2024     Meron Hackett MD  [address] [facility] [address]    Patient: Katiana High   YOB: 1955   Date of Visit: 2024     Dear Meron Hackett MD:       Thank you for referring Katiana High to me for evaluation. Below are the relevant portions of my assessment and plan of care.    If you have questions, please do not hesitate to call me. I look forward to following Katiana along with you.         Sincerely,        Jaydon Coley DNP, APRN        CC: No Recipients    Jaydon Coley DNP, APRN  [phone]  Addendum     Neuro Office Visit      Encounter Date: 2024   Patient Name: Katiana High  : 1955   MRN: [id number]   PCP: Dr Hackett  Chief Complaint:    Chief Complaint   Patient presents with   • Leg pain       History of Present Illness: Katiana High is a 68 y.o. 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             LLE pain  Taking  tid and pain is severe. Only sleeping 2 hours. Requests to allow PCP to prescribe medication. It is a 2 hour trip to our office and they do not like driving in city traffic.  PH  Onset  after surgery for tongue cancer. Pain described as toothache in the distrubution of  left lateral lower leg. In the beginning it was constant. Pain is constant but has intermittent burning. Worse with sitting too long and walking. Laying down can make it worse. Pain is achey. Pain  in left foot is  throbbing and left lateral lower leg feels hot. Left foot is numb with decreased sensation and symptoms of pins and needles. Keeps her awake at night.   Treated with tylenol and GBP with no relief. Took GBP 100mg at TID.  No change in bladder or  "bowels. No falls but has unsteady gait. Using a cane most of the time.     Denies etoh use. Has oral cancer and has no teeth or tongue. Has tongue autograft.  Has difficulty eating meat. Had chemo and radiation, sx worse after treatment.  Smoking at least a pack a day.  No history of diabetes. No FH of neuropathy.     Also had onset of low back pain after cancer and PVD. Back pain is right lower back. Constant gnawing pain after walking too much.  Had MRI and CT of back. Does not have results.     8/23/23  l-spine x-ray 5 lumbar-type vertebral bodies in anatomic alignment without fracture.   Mild-moderate degenerative disc disease worst at L5-S1. Facet hypertrophy at   L4-5 and L5 L5-S1, left greater than right.     Calcified splenic granulomas and aortoiliac calcification noted      Pt with known PVD s/p aortobifemoral graft with right borderline anastomosis stenosis. Complains of LLE pain more. Cardiology suspects sx maybe musculoskeletal or neuropathic. Taking asa, plavix, high intensity statin.  Known Carotid art disease monitored by cardiology w plans for repeat duplex 1-2 years.  Continued tob abuse.     Labs reviewed 2022-mag, folate, vit b12-nml     PMH: LBBB, PVD, s/p aortobifemoral bypass, carotid w 50-69% bilat stenosis, renal art stenosis.  SH: +tob, Was a nursing assist.  Subjective      Past Medical History:   Past Medical History:   Diagnosis Date   • Anxiety    • Hx of blood clots    • Thyroid disorder        Past Surgical History:   Past Surgical History:   Procedure Laterality Date   • CORONARY STENT PLACEMENT  03/01/2022   • TONGUE SURGERY      \"cancer surgry of tongue\"       Family History:   Family History   Problem Relation Age of Onset   • Cancer Mother    • Cancer Sister        Social History:   Social History     Socioeconomic History   • Marital status:    Tobacco Use   • Smoking status: Every Day     Packs/day: 1     Types: Cigarettes   • Smokeless tobacco: Never   Vaping Use   • " Vaping Use: Never used   Substance and Sexual Activity   • Alcohol use: Not Currently   • Drug use: Never   • Sexual activity: Defer       Medications:     Current Outpatient Medications:   •  aspirin 81 MG EC tablet, Take 1 tablet by mouth Daily., Disp: , Rfl:   •  atenolol (TENORMIN) 100 MG tablet, Take 1 tablet by mouth Daily., Disp: , Rfl:   •  busPIRone (BUSPAR) 30 MG tablet, Take 1 tablet by mouth 2 (Two) Times a Day., Disp: , Rfl:   •  clopidogrel (PLAVIX) 75 MG tablet, TAKE 1 TABLET BY MOUTH DAILY., Disp: 90 tablet, Rfl: 3  •  ezetimibe (ZETIA) 10 MG tablet, Take 1 tablet by mouth Daily. Needs liver function test for further refills., Disp: 90 tablet, Rfl: 0  •  hydroCHLOROthiazide (HYDRODIURIL) 12.5 MG tablet, Take 1 tablet by mouth Daily As Needed., Disp: , Rfl:   •  hydrOXYzine pamoate (VISTARIL) 50 MG capsule, Take 1 capsule by mouth Daily., Disp: , Rfl:   •  levothyroxine (SYNTHROID, LEVOTHROID) 50 MCG tablet, Take 1 tablet by mouth Daily., Disp: , Rfl:   •  pyridoxine (VITAMIN B-6) 100 MG tablet, Take 1 tablet by mouth Daily., Disp: , Rfl:   •  rosuvastatin (CRESTOR) 40 MG tablet, Take 1 tablet by mouth Daily., Disp: , Rfl:   •  vitamin D (ERGOCALCIFEROL) 1.25 MG (28784 UT) capsule capsule, Take 1 capsule by mouth 1 (One) Time Per Week., Disp: , Rfl:   •  Calcium Polycarbophil (FIBERCON PO), Take 1 tablet by mouth 2 (Two) Times a Day. (Patient not taking: Reported on 1/9/2024), Disp: , Rfl:   •  gabapentin (NEURONTIN) 300 MG capsule, Take 1 capsule by mouth 3 (Three) Times a Day., Disp: 270 capsule, Rfl: 1    Allergies:   No Known Allergies    PHQ-9 Total Score:     STEADI Fall Risk Assessment was completed, and patient is at LOW risk for falls.Assessment completed on:1/9/2024    Objective     Physical Exam:   Physical Exam  Neurological:      Mental Status: She is oriented to person, place, and time.      Gait: Gait is intact.      Deep Tendon Reflexes:      Reflex Scores:       Bicep reflexes are 2+  "on the right side and 2+ on the left side.       Brachioradialis reflexes are 2+ on the right side and 2+ on the left side.       Patellar reflexes are 1+ on the right side and 1+ on the left side.       Achilles reflexes are 1+ on the right side and 1+ on the left side.  Psychiatric:         Speech: Speech normal.         Neurologic Exam     Mental Status   Oriented to person, place, and time.   Follows 3 step commands.   Attention: normal. Concentration: normal.   Speech: speech is normal   Level of consciousness: alert  Knowledge: consistent with education.   Normal comprehension.     Cranial Nerves     CN III, IV, VI   CN III: no CN III palsy  CN VI: no CN VI palsy  Nystagmus: none   Upgaze: normal  Downgaze: normal  Conjugate gaze: present    Motor Exam   Muscle bulk: normal  Overall muscle tone: normal    Strength   Right biceps: 5/5  Left biceps: 5/5  Right triceps: 5/5  Left triceps: 5/5  Right interossei: 5/5  Left interossei: 5/5  Right quadriceps: 5/5  Left quadriceps: 5/5  Right anterior tibial: 5/5  Left anterior tibial: 5/5  Right posterior tibial: 5/5  Left posterior tibial: 5/5    Sensory Exam   Right arm light touch: normal  Left arm light touch: normal  Right leg light touch: decreased from toes  Left leg light touch: decreased from toes    Gait, Coordination, and Reflexes     Gait  Gait: normal    Tremor   Resting tremor: absent  Action tremor: absent    Reflexes   Right brachioradialis: 2+  Left brachioradialis: 2+  Right biceps: 2+  Left biceps: 2+  Right patellar: 1+  Left patellar: 1+  Right achilles: 1+  Left achilles: 1+  Right : 2+  Left : 2+       Vital Signs:   Vitals:    01/09/24 0910   BP: 162/64   Pulse: 66   SpO2: 98%   Weight: 58.1 kg (128 lb)   Height: 175.3 cm (69.02\")     Body mass index is 18.89 kg/m².          Assessment / Plan      Assessment/Plan:   Diagnoses and all orders for this visit:    1. Neuropathy (Primary)  -     Celiac Disease Panel; Future  -     " Comprehensive Metabolic Panel; Future  -     C-reactive Protein; Future  -     Cryoglobulin; Future  -     Heavy Metals Profile II, Urine - Urine, Clean Catch; Future  -     CHRISTEN + PE; Future  -     Lyme Disease, Line Blot; Future  -     Rheumatoid Arthritis (RA) Profile; Future  -     Vitamin B12 & Folate; Future  -     JARED by IFA, Reflex 9-biomarkers profile; Future  -     MRI Brain With & Without Contrast; Future  -     gabapentin (NEURONTIN) 300 MG capsule; Take 1 capsule by mouth 3 (Three) Times a Day.  Dispense: 270 capsule; Refill: 1    2. Squamous cell cancer of tongue  -     Celiac Disease Panel; Future  -     Comprehensive Metabolic Panel; Future  -     C-reactive Protein; Future  -     Cryoglobulin; Future  -     Heavy Metals Profile II, Urine - Urine, Clean Catch; Future  -     CHRISTEN + PE; Future  -     Lyme Disease, Line Blot; Future  -     Rheumatoid Arthritis (RA) Profile; Future  -     Vitamin B12 & Folate; Future  -     JARED by IFA, Reflex 9-biomarkers profile; Future  -     MRI Brain With & Without Contrast; Future    3. Controlled substance agreement signed           Patient Education:    As a part of this patient's therapy a controlled substance was prescribed. Instructed on the safe and proper use of this medication along with potential risks. Controlled substance contract signed and scanned. Joe will be reviewed and UDS obtained as indicated.      Reviewed medications, potential side effects and signs and symptoms to report. Discussed risk versus benefits of treatment plan with patient and/or family-including medications, labs and radiology that may be ordered. Addressed questions and concerns during visit. Patient and/or family verbalized understanding and agree with plan. Instructed to call the office with any questions and report to ER with any life-threatening symptoms.     Follow Up:   Return if symptoms worsen or fail to improve.    During this visit the following were done:  Labs Reviewed  [x]    Labs Ordered [x]    Radiology Reports Reviewed []    Radiology Ordered [x]    PCP Records Reviewed []    Referring Provider Records Reviewed []    ER Records Reviewed []    Hospital Records Reviewed []    History Obtained From Family [x]    Radiology Images Reviewed []    Other Reviewed [x]    Records Requested []      Jaydon Coley, DNP, APRN

## 2024-01-10 LAB
ALBUMIN SERPL ELPH-MCNC: 3.9 G/DL (ref 2.9–4.4)
ALBUMIN/GLOB SERPL: 1.3 {RATIO} (ref 0.7–1.7)
ALPHA1 GLOB SERPL ELPH-MCNC: 0.3 G/DL (ref 0–0.4)
ALPHA2 GLOB SERPL ELPH-MCNC: 1.2 G/DL (ref 0.4–1)
B-GLOBULIN SERPL ELPH-MCNC: 1 G/DL (ref 0.7–1.3)
CCP IGA+IGG SERPL IA-ACNC: 9 UNITS (ref 0–19)
ENDOMYSIUM IGA SER QL: NEGATIVE
GAMMA GLOB SERPL ELPH-MCNC: 0.8 G/DL (ref 0.4–1.8)
GLOBULIN SER-MCNC: 3.2 G/DL (ref 2.2–3.9)
IGA SERPL-MCNC: 119 MG/DL (ref 87–352)
IGA SERPL-MCNC: 123 MG/DL (ref 87–352)
IGG SERPL-MCNC: 696 MG/DL (ref 586–1602)
IGM SERPL-MCNC: 62 MG/DL (ref 26–217)
INTERPRETATION SERPL IEP-IMP: ABNORMAL
LABORATORY COMMENT REPORT: ABNORMAL
M PROTEIN SERPL ELPH-MCNC: ABNORMAL G/DL
PROT SERPL-MCNC: 7.1 G/DL (ref 6–8.5)
RHEUMATOID FACT SERPL-ACNC: <10 IU/ML
TTG IGA SER-ACNC: <2 U/ML (ref 0–3)

## 2024-01-10 RX ORDER — BUSPIRONE HYDROCHLORIDE 30 MG/1
TABLET ORAL
Qty: 60 TABLET | Refills: 3 | Status: SHIPPED | OUTPATIENT
Start: 2024-01-10

## 2024-01-12 LAB
ANA HOMOGEN TITR SER: ABNORMAL {TITER}
ANA SER QL IF: POSITIVE
B BURGDOR IGG PATRN SER IB-IMP: NEGATIVE
B BURGDOR IGM PATRN SER IB-IMP: NEGATIVE
B BURGDOR18KD IGG SER QL IB: NORMAL
B BURGDOR23KD IGG SER QL IB: NORMAL
B BURGDOR23KD IGM SER QL IB: NORMAL
B BURGDOR28KD IGG SER QL IB: NORMAL
B BURGDOR30KD IGG SER QL IB: NORMAL
B BURGDOR39KD IGG SER QL IB: NORMAL
B BURGDOR39KD IGM SER QL IB: NORMAL
B BURGDOR41KD IGG SER QL IB: NORMAL
B BURGDOR41KD IGM SER QL IB: NORMAL
B BURGDOR45KD IGG SER QL IB: NORMAL
B BURGDOR58KD IGG SER QL IB: NORMAL
B BURGDOR66KD IGG SER QL IB: NORMAL
B BURGDOR93KD IGG SER QL IB: NORMAL
CENTROMERE B AB SER-ACNC: <0.2 AI (ref 0–0.9)
CHROMATIN AB SERPL-ACNC: <0.2 AI (ref 0–0.9)
DSDNA AB SER-ACNC: 1 IU/ML (ref 0–9)
ENA JO1 AB SER-ACNC: <0.2 AI (ref 0–0.9)
ENA RNP AB SER-ACNC: <0.2 AI (ref 0–0.9)
ENA SCL70 AB SER-ACNC: <0.2 AI (ref 0–0.9)
ENA SM AB SER-ACNC: <0.2 AI (ref 0–0.9)
ENA SS-A AB SER-ACNC: 0.2 AI (ref 0–0.9)
ENA SS-B AB SER-ACNC: <0.2 AI (ref 0–0.9)
LABORATORY COMMENT REPORT: ABNORMAL
Lab: ABNORMAL
Lab: ABNORMAL

## 2024-01-15 LAB — CRYOGLOB SER QL 1D COLD INC: NORMAL

## 2024-01-22 ENCOUNTER — TELEPHONE (OUTPATIENT)
Dept: NEUROLOGY | Facility: CLINIC | Age: 69
End: 2024-01-22
Payer: MEDICARE

## 2024-01-22 LAB
ARSENIC UR-MCNC: 22 UG/L (ref 0–9)
ARSENIC.INORGANIC+METHYLATED 24H UR-MCNC: <20 UG/L
ARSENIC/CREAT UR: 20 UG/G CREAT
CADMIUM 24H UR-MCNC: 2.3 UG/L
CADMIUM/CREAT 24H UR: 2.1 UG/G CREAT (ref 0–1.9)
CREAT UR-MCNC: 1.11 G/L (ref 0.3–3)
DIMETHYLARSINATE UR-MCNC: <5 UG/L
INORG ARSENIC UR-MCNC: <10 UG/L
LEAD 24H UR-MCNC: 1 UG/L (ref 0–49)
LEAD/CREAT UR: 1 UG/G CREAT (ref 0–49)
Lab: NORMAL
MERCURY 24H UR-MCNC: ABNORMAL UG/L (ref 0–19)
MMA UR-MCNC: <5 UG/L

## 2024-01-22 NOTE — TELEPHONE ENCOUNTER
Called patient and gave results.  Patient was understanding and appreciative.    ----- Message from Jaydon Coley, JAZMIN, APRN sent at 1/22/2024  4:04 PM EST -----  Please notify pt her initial arsenic level was elevated but follow up tests showed it to be a biologic source from her diet. Cadmium was slightly over the accepted limit but did not meet criteria for occupational exposure. initial JARED test for autoimmune connective tissue diseases was positive but follow up antibody testing was negative. 30%  of the population will have a positive JARED with no disease. Labs were negative for multiple myeloma. Vit b12 and folate, lyme disease, cryoglobulins, celiac disease, rheumatoid arthritis, kidney and liver function and inflammatory markers were all normal.

## 2024-01-29 ENCOUNTER — HOSPITAL ENCOUNTER (OUTPATIENT)
Facility: HOSPITAL | Age: 69
Discharge: HOME | End: 2024-01-29
Payer: MEDICARE

## 2024-01-29 DIAGNOSIS — E03.9 HYPOTHYROIDISM, UNSPECIFIED TYPE: ICD-10-CM

## 2024-01-29 DIAGNOSIS — E78.5 HYPERLIPIDEMIA, UNSPECIFIED HYPERLIPIDEMIA TYPE: ICD-10-CM

## 2024-01-29 DIAGNOSIS — Z11.59 NEED FOR HEPATITIS C SCREENING TEST: ICD-10-CM

## 2024-01-29 LAB
ALBUMIN SERPL-MCNC: 4.2 G/DL (ref 3.4–4.8)
ALBUMIN/GLOB SERPL: 1.9 {RATIO} (ref 0.8–2)
ALP SERPL-CCNC: 65 U/L (ref 25–100)
ALT SERPL-CCNC: 18 U/L (ref 4–36)
ANION GAP SERPL CALCULATED.3IONS-SCNC: 9 MMOL/L (ref 3–16)
AST SERPL-CCNC: 19 U/L (ref 8–33)
BASOPHILS # BLD: 0.1 K/UL (ref 0–0.1)
BASOPHILS NFR BLD: 1.2 %
BILIRUB SERPL-MCNC: 0.3 MG/DL (ref 0.3–1.2)
BUN SERPL-MCNC: 15 MG/DL (ref 6–20)
CALCIUM SERPL-MCNC: 9.8 MG/DL (ref 8.5–10.5)
CHLORIDE SERPL-SCNC: 107 MMOL/L (ref 98–107)
CO2 SERPL-SCNC: 27 MMOL/L (ref 20–30)
CREAT SERPL-MCNC: 0.9 MG/DL (ref 0.4–1.2)
EOSINOPHIL # BLD: 0.4 K/UL (ref 0–0.4)
EOSINOPHIL NFR BLD: 5 %
ERYTHROCYTE [DISTWIDTH] IN BLOOD BY AUTOMATED COUNT: 14.9 % (ref 11–16)
GFR SERPLBLD CREATININE-BSD FMLA CKD-EPI: >60 ML/MIN/{1.73_M2}
GLOBULIN SER CALC-MCNC: 2.2 G/DL
GLUCOSE SERPL-MCNC: 85 MG/DL (ref 74–106)
HCT VFR BLD AUTO: 43.1 % (ref 37–47)
HGB BLD-MCNC: 13.8 G/DL (ref 11.5–16.5)
IMM GRANULOCYTES # BLD: 0 K/UL
IMM GRANULOCYTES NFR BLD: 0.5 % (ref 0–5)
LYMPHOCYTES # BLD: 2 K/UL (ref 1.5–4)
LYMPHOCYTES NFR BLD: 24.7 %
MCH RBC QN AUTO: 28.6 PG (ref 27–32)
MCHC RBC AUTO-ENTMCNC: 32 G/DL (ref 31–35)
MCV RBC AUTO: 89.2 FL (ref 80–100)
MONOCYTES # BLD: 0.7 K/UL (ref 0.2–0.8)
MONOCYTES NFR BLD: 9.1 %
NEUTROPHILS # BLD: 4.8 K/UL (ref 2–7.5)
NEUTS SEG NFR BLD: 59.5 %
PLATELET # BLD AUTO: 223 K/UL (ref 150–400)
PMV BLD AUTO: 9.9 FL (ref 6–10)
POTASSIUM SERPL-SCNC: 5.2 MMOL/L (ref 3.4–5.1)
PROT SERPL-MCNC: 6.4 G/DL (ref 6.4–8.3)
RBC # BLD AUTO: 4.83 M/UL (ref 3.8–5.8)
SODIUM SERPL-SCNC: 143 MMOL/L (ref 136–145)
TSH SERPL DL<=0.005 MIU/L-ACNC: 1.79 UIU/ML (ref 0.27–4.2)
WBC # BLD AUTO: 8 K/UL (ref 4–11)

## 2024-01-29 PROCEDURE — 85025 COMPLETE CBC W/AUTO DIFF WBC: CPT

## 2024-01-29 PROCEDURE — 84443 ASSAY THYROID STIM HORMONE: CPT

## 2024-01-29 PROCEDURE — 86803 HEPATITIS C AB TEST: CPT

## 2024-01-29 PROCEDURE — 80053 COMPREHEN METABOLIC PANEL: CPT

## 2024-01-29 PROCEDURE — 36415 COLL VENOUS BLD VENIPUNCTURE: CPT

## 2024-01-29 RX ORDER — ROSUVASTATIN CALCIUM 40 MG/1
40 TABLET, COATED ORAL DAILY
Qty: 90 TABLET | Refills: 1 | Status: SHIPPED | OUTPATIENT
Start: 2024-01-29

## 2024-01-30 LAB — HCV AB SERPL QL IA: NORMAL

## 2024-01-31 RX ORDER — DULOXETIN HYDROCHLORIDE 20 MG/1
20 CAPSULE, DELAYED RELEASE ORAL DAILY
Qty: 30 CAPSULE | Refills: 1 | Status: SHIPPED | OUTPATIENT
Start: 2024-01-31

## 2024-02-08 ENCOUNTER — OFFICE VISIT (OUTPATIENT)
Dept: PRIMARY CARE CLINIC | Age: 69
End: 2024-02-08
Payer: MEDICARE

## 2024-02-08 VITALS
OXYGEN SATURATION: 94 % | RESPIRATION RATE: 18 BRPM | BODY MASS INDEX: 19.55 KG/M2 | DIASTOLIC BLOOD PRESSURE: 64 MMHG | SYSTOLIC BLOOD PRESSURE: 134 MMHG | HEART RATE: 62 BPM | WEIGHT: 132.4 LBS

## 2024-02-08 DIAGNOSIS — I73.9 PVD (PERIPHERAL VASCULAR DISEASE) (HCC): ICD-10-CM

## 2024-02-08 DIAGNOSIS — R13.10 DYSPHAGIA, UNSPECIFIED TYPE: ICD-10-CM

## 2024-02-08 DIAGNOSIS — R26.81 UNSTEADY GAIT: ICD-10-CM

## 2024-02-08 DIAGNOSIS — E03.9 HYPOTHYROIDISM, UNSPECIFIED TYPE: ICD-10-CM

## 2024-02-08 DIAGNOSIS — I10 BENIGN HYPERTENSION: ICD-10-CM

## 2024-02-08 DIAGNOSIS — E78.5 HYPERLIPIDEMIA, UNSPECIFIED HYPERLIPIDEMIA TYPE: Primary | ICD-10-CM

## 2024-02-08 PROCEDURE — 3075F SYST BP GE 130 - 139MM HG: CPT | Performed by: INTERNAL MEDICINE

## 2024-02-08 PROCEDURE — 3078F DIAST BP <80 MM HG: CPT | Performed by: INTERNAL MEDICINE

## 2024-02-08 PROCEDURE — 99213 OFFICE O/P EST LOW 20 MIN: CPT | Performed by: INTERNAL MEDICINE

## 2024-02-08 PROCEDURE — 1123F ACP DISCUSS/DSCN MKR DOCD: CPT | Performed by: INTERNAL MEDICINE

## 2024-02-08 RX ORDER — DULOXETIN HYDROCHLORIDE 30 MG/1
30 CAPSULE, DELAYED RELEASE ORAL DAILY
Qty: 30 CAPSULE | Refills: 4 | Status: SHIPPED | OUTPATIENT
Start: 2024-02-08

## 2024-02-08 RX ORDER — GABAPENTIN 300 MG/1
300 CAPSULE ORAL 3 TIMES DAILY
COMMUNITY
Start: 2024-02-06

## 2024-02-08 NOTE — PROGRESS NOTES
Chief Complaint   Patient presents with    Hypertension    Hyperlipidemia    Insomnia       Have you seen any other physician or provider since your last visit yes - neurology     Have you had any other diagnostic tests since your last visit? labs    Have you changed or stopped any medications since your last visit? Yes gabapentin switched 300 TID          
01/29/2024 09:27 AM    LABALBU 4.2 01/29/2024 09:27 AM    BILITOT 0.3 01/29/2024 09:27 AM    ALT 18 01/29/2024 09:27 AM    AST 19 01/29/2024 09:27 AM       LDL Calculated (mg/dL)   Date Value   08/02/2023 54         Lab Results   Component Value Date/Time    WBC 8.0 01/29/2024 09:27 AM    NEUTROABS 4.8 01/29/2024 09:27 AM    HGB 13.8 01/29/2024 09:27 AM    HCT 43.1 01/29/2024 09:27 AM    MCV 89.2 01/29/2024 09:27 AM     01/29/2024 09:27 AM       Lab Results   Component Value Date    TSH 1.79 01/29/2024         ASSESSMENT/PLAN:     1. Hyperlipidemia, unspecified hyperlipidemia type  I have advised her on low-fat diet, exercise and weight control. I am going to continue on current medication. I have also advised her on the possible side effects from the medication.  I will monitor her liver functions and lipid profile every few months.  Lipid well controlled.  Lab Results   Component Value Date    LDLCALC 54 08/02/2023     2. Hypothyroidism, unspecified type  Last TSH was WNL. I am going to check the TSH every few months.  I am going to continue the current dose of Levothyroxine.  I have advised her on the importance of taking the medication on a daily basis.    Lab Results   Component Value Date    TSH 1.79 01/29/2024     3. Benign hypertension  BP is stable. I have advised her on low-sodium diet, exercise and weight control.  I am going to continue current medication. Will monitor her renal function every few months, have advised her to check blood pressure frequently and to keep a record of this.     4. PVD (peripheral vascular disease) (HCC)  Continue on aspirin and statin.  Proceed with checking lipid profile to see if any additional adjustment of statin need to be done.  Long conversation with her regarding the importance of smoking cessation.  Make sure blood pressure and lipid profile under good control.    5. Dysphagia, unspecified type  Will proceed with referral to Speech therapy specially with her

## 2024-02-13 DIAGNOSIS — R13.10 DYSPHAGIA, UNSPECIFIED TYPE: Primary | ICD-10-CM

## 2024-02-16 ENCOUNTER — HOSPITAL ENCOUNTER (OUTPATIENT)
Dept: SPEECH THERAPY | Facility: HOSPITAL | Age: 69
Setting detail: THERAPIES SERIES
Discharge: HOME | End: 2024-02-16
Payer: MEDICARE

## 2024-02-16 PROCEDURE — 92612 ENDOSCOPY SWALLOW (FEES) VID: CPT

## 2024-02-16 NOTE — PROGRESS NOTES
None reported or indicated   Known Allergies: No known allergies  CPT Code: 60725    Current Diet: Pureed/minced Diet  Current Liquids:Thin Liquids    Respiratory Status:Independent  Behavioral Observation:alert, oriented, cooperative, and compliant  Cognition: Exam not limited by cognition    ORAL MECHANISM EXAM:  Dentition:  Edentulous  Facial Symmetry: No deficits observed  Labial/Facial: No deficits observed  Lingual:  left RFFF ; reduced ROM  Oral Mucosa: No deficits observed  Mandibular Movement: No deficits observed  Velum: No deficits observed  Sensation: No deficits observed  Cough/Reflexes: No deficits observed    FEES PROCEDURE DETAILS:  Procedure performed by: Edelmira Shaw MS, CCC-SLP  Feeder/Assistant: Silverio Nathan, [facility] graduate clinician  Patient Positioning: Chair/90 degrees  Procedure explained and education provided to patient and sister including purpose, benefits and risks of testing. Understanding and agreement with testing was verbalized.     A flexible fiberoptic nasoendoscope was passed transnasally to view the nasopharynx, oropharynx, hypopharynx, larynx through the right nares without difficuty.    Patient tolerance of procedure: No complications or complaints observed or reported    ASSESSMENT:   Thin Nectar Thick Honey Thick Puree Mixed Solid Capsule   Swallow Triggered vallecula vallecula DNT vallecula Liquid - pyriforms  Solid - vallecula vallecula vallecula     Vallecula Pooling no no DNT no mild Yes, mild no     Pyriform Pooling no no DNT no no Yes, mild no     Laryngeal Penetration no no DNT no no no no     Aspiration no no DNT no no no no     Spont. Cough/Clear N/a N/a DNT N/A N/A N/A N/A     PAS* 1 1 DNT 1 1 1 1     Vallecula Residual** scant scant DNT no no Yes, mild no     Pyriform Residual scant scant DNT no no Yes, mild no     Esophageal scant scant DNT no no no no     * Penetration-Aspiration Score (PAS) reflects the most frequently occurring

## 2024-02-23 ENCOUNTER — HOSPITAL ENCOUNTER (OUTPATIENT)
Dept: PHYSICAL THERAPY | Facility: HOSPITAL | Age: 69
Setting detail: THERAPIES SERIES
Discharge: HOME | End: 2024-02-23
Payer: MEDICARE

## 2024-02-23 PROCEDURE — 97110 THERAPEUTIC EXERCISES: CPT

## 2024-02-23 PROCEDURE — 97161 PT EVAL LOW COMPLEX 20 MIN: CPT

## 2024-02-23 NOTE — PROGRESS NOTES
IADL Comments: Pt able to do dishes, sweep, mop, do her bed. Daughter does laundry, cooking and has others take care of outside activites.  Receives Help From: Family  Transfer Assistance: Independent  Active : No    OBJECTIVE EXAMINATION   Restrictions:  Hx of CA    Review of Systems:  Vision: Impaired  Visual Deficits: Wears glasses (Trifocals)  Hearing: Within functional limits  Overall Orientation Status: Within Normal Limits  Follows Commands: Within Functional Limits    Observations:   Pt ambulates with SPC, caregiver present     Neuro Screen:  Sensation      Sensation  Overall Sensation Status: Impaired  Light Touch: Partial deficits in the RLE, Partial deficits in the LLE     Left Strength  Right Strength         Strength LLE  L Hip Flexion: 4/5  L Hip Extension: 4+/5  L Hip ABduction: 4/5  L Hip ADduction: 4-/5  L Hip Internal Rotation: 3+/5  L Hip External Rotation: 3+/5  L Knee Flexion: 5/5  L Knee Extension: 4+/5  L Ankle Dorsiflexion: 5/5    Strength RLE  R Hip Flexion: 4+/5  R Hip Extension: 4+/5  R Hip ABduction: 4+/5  R Hip ADduction: 4/5  R Hip Internal Rotation: 4/5  R Hip External Rotation: 4/5  R Knee Flexion: 5/5  R Knee Extension: 5/5  R Ankle Dorsiflexion: 4+/5     Balance/Gait Assessment(s) Performed:  Cabrera Balance Scale   1. Sitting to Standing: Able to stand without using hands and stabilize independently  2. Standing Unsupported: Able to stand 2 minutes with supervision  3. Sitting with Back Unsupported but Feet Supported on Floor or on a Stool: Able to sit safely and securely for 2 minutes  4. Standing to Sitting: Sits safely with minimal use of hands  5.  Transfers: Able to transfer safely definite need of hands  6. Standing Unsupported with Eyes Closed: Able to stand 10 seconds with supervision  7. Standing Unsupported with Feet Together: Able to place feet together independently and stand 1 minute with supervision  8. Reach Forward with Outstretched Arm While Standing: Can

## 2024-02-26 RX ORDER — HYDROCHLOROTHIAZIDE 12.5 MG/1
12.5 TABLET ORAL DAILY PRN
Qty: 30 TABLET | Refills: 1 | Status: SHIPPED | OUTPATIENT
Start: 2024-02-26

## 2024-02-29 ENCOUNTER — APPOINTMENT (OUTPATIENT)
Dept: PHYSICAL THERAPY | Facility: HOSPITAL | Age: 69
End: 2024-02-29
Payer: MEDICARE

## 2024-02-29 ENCOUNTER — HOSPITAL ENCOUNTER (OUTPATIENT)
Dept: MRI IMAGING | Facility: HOSPITAL | Age: 69
Discharge: HOME | End: 2024-02-29
Admitting: NURSE PRACTITIONER
Payer: MEDICARE

## 2024-02-29 DIAGNOSIS — G62.9 NEUROPATHY: ICD-10-CM

## 2024-02-29 DIAGNOSIS — C02.9 SQUAMOUS CELL CANCER OF TONGUE: ICD-10-CM

## 2024-02-29 PROCEDURE — 70553 MRI BRAIN STEM W/O & W/DYE: CPT

## 2024-02-29 PROCEDURE — A9577 INJ MULTIHANCE: HCPCS | Performed by: NURSE PRACTITIONER

## 2024-02-29 PROCEDURE — 0 GADOBENATE DIMEGLUMINE 529 MG/ML SOLUTION: Performed by: NURSE PRACTITIONER

## 2024-02-29 RX ADMIN — GADOBENATE DIMEGLUMINE 13 ML: 529 INJECTION, SOLUTION INTRAVENOUS at 08:15

## 2024-03-01 ENCOUNTER — TELEPHONE (OUTPATIENT)
Dept: NEUROLOGY | Facility: CLINIC | Age: 69
End: 2024-03-01
Payer: MEDICARE

## 2024-03-01 NOTE — TELEPHONE ENCOUNTER
Called patient and gave results.  Patient was understanding and appreciative.    ----- Message from Jaydon Coley DNP, APRN sent at 2/29/2024  5:01 PM EST -----  Please notify pt MRI of brain shows change of aging. No concerning findings.

## 2024-03-05 ENCOUNTER — HOSPITAL ENCOUNTER (OUTPATIENT)
Dept: PHYSICAL THERAPY | Facility: HOSPITAL | Age: 69
Setting detail: THERAPIES SERIES
Discharge: HOME | End: 2024-03-05
Payer: MEDICARE

## 2024-03-05 PROCEDURE — 97110 THERAPEUTIC EXERCISES: CPT

## 2024-03-05 PROCEDURE — 97530 THERAPEUTIC ACTIVITIES: CPT

## 2024-03-05 PROCEDURE — 97112 NEUROMUSCULAR REEDUCATION: CPT

## 2024-03-05 NOTE — FLOWSHEET NOTE
CGA. Pt is progressing well with POC    Pain after treatment:     0 /10    Prognosis: [x] Good [] Fair  [] Poor    Patient Requires Follow-up: [x] Yes  [] No    Plan:   [x] Continue per plan of care [] Alter current plan (see comments)  [] Plan of care initiated [] Hold pending MD visit [] Discharge    Plan for Next Session:        Electronically signed by:  Darshan Bass PT

## 2024-03-12 ENCOUNTER — HOSPITAL ENCOUNTER (OUTPATIENT)
Dept: PHYSICAL THERAPY | Facility: HOSPITAL | Age: 69
Setting detail: THERAPIES SERIES
Discharge: HOME | End: 2024-03-12
Payer: MEDICARE

## 2024-03-12 PROCEDURE — 97112 NEUROMUSCULAR REEDUCATION: CPT

## 2024-03-12 PROCEDURE — 97110 THERAPEUTIC EXERCISES: CPT

## 2024-03-12 NOTE — FLOWSHEET NOTE
AD.     Pain after treatment:     0 /10    Prognosis: [x] Good [] Fair  [] Poor    Patient Requires Follow-up: [x] Yes  [] No    Plan:   [x] Continue per plan of care [] Alter current plan (see comments)  [] Plan of care initiated [] Hold pending MD visit [] Discharge    Plan for Next Session:        Electronically signed by:  Darshan Bass PT

## 2024-03-14 ENCOUNTER — HOSPITAL ENCOUNTER (OUTPATIENT)
Dept: PHYSICAL THERAPY | Facility: HOSPITAL | Age: 69
Setting detail: THERAPIES SERIES
Discharge: HOME | End: 2024-03-14
Payer: MEDICARE

## 2024-03-14 PROCEDURE — 97110 THERAPEUTIC EXERCISES: CPT | Performed by: PHYSICAL THERAPIST

## 2024-03-14 NOTE — FLOWSHEET NOTE
Poor    Patient Requires Follow-up: [x] Yes  [] No    Plan:   [x] Continue per plan of care [] Alter current plan (see comments)  [] Plan of care initiated [] Hold pending MD visit [] Discharge    Plan for Next Session:        Electronically signed by:  Beata Hitchcock PT

## 2024-03-18 ENCOUNTER — HOSPITAL ENCOUNTER (OUTPATIENT)
Dept: PHYSICAL THERAPY | Facility: HOSPITAL | Age: 69
Setting detail: THERAPIES SERIES
Discharge: HOME | End: 2024-03-18
Payer: MEDICARE

## 2024-03-18 PROCEDURE — 97112 NEUROMUSCULAR REEDUCATION: CPT

## 2024-03-18 PROCEDURE — 97530 THERAPEUTIC ACTIVITIES: CPT

## 2024-03-18 PROCEDURE — 97110 THERAPEUTIC EXERCISES: CPT

## 2024-03-18 NOTE — FLOWSHEET NOTE
Physical Therapy Daily Treatment Note/Reassessment/DC  Date:  3/18/2024    TIme In: 0833                    Time Out: 1006    Patient Name:  Shirley Saravia    :  1955  MRN: [id number]    Restrictions/Precautions:    Pertinent Medical History:  Medical/Treatment Diagnosis Information:  Unsteadiness on feet [R26.81]     Insurance/Certification information:  Payor: [facility] MEDICARE / Plan: [employer] MEDIBLUE ESSENTIAL/PLUS / Product Type: *No Product type* /   Physician Information:  cFo Waldron MD  Plan of care signed (Y/N):    Visit# / total visits:     5 /    G-Code (if applicable):      Date / Visit # G-Code Applied:         Progress Note: [x]  Yes  []  No  Next due by: D/C    Pain level: 0/10    Subjective: Pt reports she is not as dependent on her cane and is more comfortable not using her cane outside or inside the home.    Objective:  Observation: Pt presents with SPC  Test measurements:    5 Times Sit to Stand: 22 seconds  TUG 15s  Modified CTSIB  Standing on level surface, NBOS, EO: 30 seconds  Standing on level surface, NBOS, EC: 30 seconds  Standing on Airex, EO: 30 seconds  Standing on Airex, EC: 30 seconds  CARDOSO 44/56     p/down stairs at home with Mod I and increased ease-ongoing    Strength LLE  L Hip Flexion: 5/5  L Hip ABduction: 4+/5  L Hip ADduction: 4+/5  L Hip Internal Rotation: 4+/5  L Hip External Rotation: 4+/5  Strength RLE  R Hip ADduction: 4+/5  R Hip Internal Rotation: 4+/5  R Hip External Rotation: 4+/5  Palpation:    Exercises:  Exercise Resistance/Repetitions Date performed   Nustep  lvl 6x6'  18   Chair squats  3x10  18   Standing marching  3x30\"  18   Mini lunge each way  x20 B  18   Standing hip abd/flex/ext 3x10 2\" B #2 18   Tandem walking  x4 laps  18   lat band walks  x4 laps GTB 18   Tandem stance  x2' total  18   EC WBOS/NBOS  3x30\" each  18   Airex WBOS EC  3x30\"  18   Airex reaching  2'  18          Other Therapeutic Activities:  Reassessment     Hallway Stair training

## 2024-03-20 ENCOUNTER — APPOINTMENT (OUTPATIENT)
Dept: PHYSICAL THERAPY | Facility: HOSPITAL | Age: 69
End: 2024-03-20
Payer: MEDICARE

## 2024-03-22 ENCOUNTER — APPOINTMENT (OUTPATIENT)
Dept: PHYSICAL THERAPY | Facility: HOSPITAL | Age: 69
End: 2024-03-22
Payer: MEDICARE

## 2024-03-25 ENCOUNTER — HOSPITAL ENCOUNTER (EMERGENCY)
Facility: HOSPITAL | Age: 69
Discharge: HOME | End: 2024-03-25
Attending: EMERGENCY MEDICINE
Payer: MEDICARE

## 2024-03-25 VITALS
OXYGEN SATURATION: 97 % | SYSTOLIC BLOOD PRESSURE: 124 MMHG | BODY MASS INDEX: 19.85 KG/M2 | WEIGHT: 134 LBS | HEART RATE: 51 BPM | TEMPERATURE: 98.2 F | DIASTOLIC BLOOD PRESSURE: 67 MMHG | RESPIRATION RATE: 16 BRPM | HEIGHT: 69 IN

## 2024-03-25 DIAGNOSIS — R04.0 EPISTAXIS: Primary | ICD-10-CM

## 2024-03-25 LAB
ANION GAP SERPL CALCULATED.3IONS-SCNC: 10 MMOL/L (ref 3–16)
BASOPHILS # BLD: 0.1 K/UL (ref 0–0.1)
BASOPHILS NFR BLD: 1 %
BUN SERPL-MCNC: 14 MG/DL (ref 6–20)
CALCIUM SERPL-MCNC: 10.2 MG/DL (ref 8.5–10.5)
CHLORIDE SERPL-SCNC: 106 MMOL/L (ref 98–107)
CO2 SERPL-SCNC: 26 MMOL/L (ref 20–30)
CREAT SERPL-MCNC: 0.8 MG/DL (ref 0.4–1.2)
EOSINOPHIL # BLD: 0.3 K/UL (ref 0–0.4)
EOSINOPHIL NFR BLD: 4 %
ERYTHROCYTE [DISTWIDTH] IN BLOOD BY AUTOMATED COUNT: 14.5 % (ref 11–16)
GFR SERPLBLD CREATININE-BSD FMLA CKD-EPI: 80 ML/MIN/{1.73_M2}
GLUCOSE SERPL-MCNC: 96 MG/DL (ref 74–106)
HCT VFR BLD AUTO: 45.2 % (ref 37–47)
HGB BLD-MCNC: 14.7 G/DL (ref 11.5–16.5)
IMM GRANULOCYTES # BLD: 0 K/UL
IMM GRANULOCYTES NFR BLD: 0.3 % (ref 0–5)
INR PPP: 0.86 (ref 0.87–1.11)
LYMPHOCYTES # BLD: 2.2 K/UL (ref 1.5–4)
LYMPHOCYTES NFR BLD: 32.8 %
MCH RBC QN AUTO: 29 PG (ref 27–32)
MCHC RBC AUTO-ENTMCNC: 32.5 G/DL (ref 31–35)
MCV RBC AUTO: 89.2 FL (ref 80–100)
MONOCYTES # BLD: 0.7 K/UL (ref 0.2–0.8)
MONOCYTES NFR BLD: 9.8 %
NEUTROPHILS # BLD: 3.5 K/UL (ref 2–7.5)
NEUTS SEG NFR BLD: 52.1 %
PLATELET # BLD AUTO: 208 K/UL (ref 150–400)
PMV BLD AUTO: 9.7 FL (ref 6–10)
POTASSIUM SERPL-SCNC: 5.1 MMOL/L (ref 3.4–5.1)
PROTHROMBIN TIME: 11.5 SEC (ref 11.8–14.2)
RBC # BLD AUTO: 5.07 M/UL (ref 3.8–5.8)
SODIUM SERPL-SCNC: 142 MMOL/L (ref 136–145)
WBC # BLD AUTO: 6.7 K/UL (ref 4–11)

## 2024-03-25 PROCEDURE — 85025 COMPLETE CBC W/AUTO DIFF WBC: CPT

## 2024-03-25 PROCEDURE — 85610 PROTHROMBIN TIME: CPT

## 2024-03-25 PROCEDURE — 36415 COLL VENOUS BLD VENIPUNCTURE: CPT

## 2024-03-25 PROCEDURE — 80048 BASIC METABOLIC PNL TOTAL CA: CPT

## 2024-03-25 PROCEDURE — 99283 EMERGENCY DEPT VISIT LOW MDM: CPT

## 2024-03-25 RX ORDER — ATENOLOL 50 MG/1
100 TABLET ORAL ONCE
Status: DISCONTINUED | OUTPATIENT
Start: 2024-03-25 | End: 2024-03-25

## 2024-03-25 RX ORDER — CETIRIZINE HYDROCHLORIDE 10 MG/1
10 TABLET ORAL DAILY
Qty: 30 TABLET | Refills: 0 | Status: SHIPPED | OUTPATIENT
Start: 2024-03-25

## 2024-03-25 ASSESSMENT — PAIN - FUNCTIONAL ASSESSMENT: PAIN_FUNCTIONAL_ASSESSMENT: 0-10

## 2024-03-25 ASSESSMENT — PAIN SCALES - GENERAL: PAINLEVEL_OUTOF10: 0

## 2024-03-25 NOTE — ED NOTES
Reviewed discharge plan with Shirley Saravia.  Encouraged her to f/u with Fco Waldron MD and she understood.  NAD noted on discharge, gait steady.  Reviewed discharge prescription for:     Current Discharge Medication List        START taking these medications    Details   cetirizine (ZYRTEC) 10 MG tablet Take 1 tablet by mouth daily  Qty: 30 tablet, Refills: 0             Shirley states understanding of how and when to take medications.      Electronically signed by Michelle Garcia RN on 3/25/2024 at 10:05 AM

## 2024-03-25 NOTE — ED PROVIDER NOTES
.        [facility]  EMERGENCY DEPARTMENT ENCOUNTER        Pt Name: Shirley Saravia  MRN: [id number]  Birthdate 1955  Date of evaluation: 3/25/2024  Provider: Noelle Arana MD  PCP: Fco Waldron MD  Note Started: 8:46 AM EDT 3/25/24    CHIEF COMPLAINT       Chief Complaint   Patient presents with    Epistaxis       HISTORY OF PRESENT ILLNESS: 1 or more Elements     History from : Patient    Limitations to history : None    Shirley Saravia is a 68 y.o. female who presents complaining of a nosebleed which started about 45 minutes ago patient has had resection of her tongue cancer 3 years ago which she is currently in remission she has not had any other head or neck surgery she is on aspirin and Plavix she has had sinus pressure recently and she states that every morning when she gets up she has to blow her nose this morning when she got up and blew her nose is when the bleeding started she has not had any other bleeding.    Nursing Notes were all reviewed and agreed with or any disagreements were addressed in the HPI.    REVIEW OF SYSTEMS :      Review of Systems     systems reviewed and negative except as in HPI/MDM    SURGICAL HISTORY     Past Surgical History:   Procedure Laterality Date    VASCULAR SURGERY Bilateral        CURRENTMEDICATIONS       Previous Medications    ALBUTEROL SULFATE HFA (VENTOLIN HFA) 108 (90 BASE) MCG/ACT INHALER    Inhale 2 puffs into the lungs every 6 hours as needed for Wheezing    ASPIRIN 81 MG EC TABLET    Take 1 tablet by mouth daily    ATENOLOL (TENORMIN) 100 MG TABLET    TAKE 1 TABLET BY MOUTH DAILY    BUSPIRONE (BUSPAR) 30 MG TABLET    TAKE 1 TABLET BY MOUTH IN THE MORNING AND AT BEDTIME    CLOPIDOGREL (PLAVIX) 75 MG TABLET    Take 1 tablet by mouth daily    DULOXETINE (CYMBALTA) 30 MG EXTENDED RELEASE CAPSULE    Take 1 capsule by mouth daily    EZETIMIBE (ZETIA) 10 MG TABLET    Take 1 tablet by mouth daily    GABAPENTIN (NEURONTIN) 300 MG CAPSULE

## 2024-04-26 RX ORDER — BUSPIRONE HYDROCHLORIDE 30 MG/1
TABLET ORAL
Qty: 60 TABLET | Refills: 3 | Status: SHIPPED | OUTPATIENT
Start: 2024-04-26

## 2024-05-08 ENCOUNTER — OFFICE VISIT (OUTPATIENT)
Dept: PRIMARY CARE CLINIC | Age: 69
End: 2024-05-08
Payer: MEDICARE

## 2024-05-08 VITALS
WEIGHT: 138 LBS | BODY MASS INDEX: 20.38 KG/M2 | DIASTOLIC BLOOD PRESSURE: 52 MMHG | RESPIRATION RATE: 18 BRPM | OXYGEN SATURATION: 98 % | HEART RATE: 75 BPM | SYSTOLIC BLOOD PRESSURE: 102 MMHG

## 2024-05-08 DIAGNOSIS — I10 BENIGN HYPERTENSION: ICD-10-CM

## 2024-05-08 DIAGNOSIS — I73.9 PVD (PERIPHERAL VASCULAR DISEASE) (HCC): ICD-10-CM

## 2024-05-08 DIAGNOSIS — E03.9 HYPOTHYROIDISM, UNSPECIFIED TYPE: ICD-10-CM

## 2024-05-08 DIAGNOSIS — M54.42 ACUTE BILATERAL LOW BACK PAIN WITH LEFT-SIDED SCIATICA: ICD-10-CM

## 2024-05-08 DIAGNOSIS — F41.9 ANXIETY: ICD-10-CM

## 2024-05-08 DIAGNOSIS — E78.5 HYPERLIPIDEMIA, UNSPECIFIED HYPERLIPIDEMIA TYPE: Primary | ICD-10-CM

## 2024-05-08 PROCEDURE — 3078F DIAST BP <80 MM HG: CPT | Performed by: INTERNAL MEDICINE

## 2024-05-08 PROCEDURE — 99214 OFFICE O/P EST MOD 30 MIN: CPT | Performed by: INTERNAL MEDICINE

## 2024-05-08 PROCEDURE — 1123F ACP DISCUSS/DSCN MKR DOCD: CPT | Performed by: INTERNAL MEDICINE

## 2024-05-08 PROCEDURE — 3074F SYST BP LT 130 MM HG: CPT | Performed by: INTERNAL MEDICINE

## 2024-05-08 RX ORDER — ALPRAZOLAM 0.25 MG/1
0.25 TABLET ORAL 2 TIMES DAILY PRN
Qty: 40 TABLET | Refills: 0 | Status: SHIPPED | OUTPATIENT
Start: 2024-05-08 | End: 2024-06-07

## 2024-05-08 RX ORDER — TIZANIDINE 2 MG/1
2 TABLET ORAL 3 TIMES DAILY PRN
Qty: 45 TABLET | Refills: 1 | Status: SHIPPED | OUTPATIENT
Start: 2024-05-08

## 2024-05-08 RX ORDER — DULOXETIN HYDROCHLORIDE 20 MG/1
20 CAPSULE, DELAYED RELEASE ORAL DAILY
COMMUNITY
End: 2024-05-08

## 2024-05-08 ASSESSMENT — ENCOUNTER SYMPTOMS
COUGH: 0
WHEEZING: 0
EYE DISCHARGE: 0
SINUS PRESSURE: 0
SORE THROAT: 0
NAUSEA: 0
VOMITING: 0
ABDOMINAL PAIN: 0
BACK PAIN: 1
SHORTNESS OF BREATH: 0

## 2024-05-08 NOTE — PROGRESS NOTES
Chief Complaint   Patient presents with    Hypertension    Hyperlipidemia    Insomnia       Have you seen any other physician or provider since your last visit yes - oncology , ER     Have you had any other diagnostic tests since your last visit? no    Have you changed or stopped any medications since your last visit? no                              
clopidogrel (PLAVIX) 75 MG tablet Take 1 tablet by mouth daily      aspirin 81 MG EC tablet Take 1 tablet by mouth daily 90 tablet 1    pyridoxine (B-6) 100 MG tablet Take 1 tablet by mouth daily 90 tablet 1    albuterol sulfate HFA (VENTOLIN HFA) 108 (90 Base) MCG/ACT inhaler Inhale 2 puffs into the lungs every 6 hours as needed for Wheezing 18 g 1        Review of Systems   Constitutional:  Negative for chills and fever.   HENT:  Negative for congestion, sinus pressure and sore throat.    Eyes:  Negative for discharge and visual disturbance.   Respiratory:  Negative for cough, shortness of breath and wheezing.    Cardiovascular:  Negative for chest pain and palpitations.   Gastrointestinal:  Negative for abdominal pain, nausea and vomiting.   Endocrine: Negative for cold intolerance and heat intolerance.   Genitourinary:  Negative for dysuria, frequency and urgency.   Musculoskeletal:  Positive for arthralgias, back pain and gait problem.   Skin:  Negative for rash and wound.   Neurological:  Positive for weakness. Negative for syncope, numbness and headaches.   Hematological: Negative.    Psychiatric/Behavioral:  Positive for sleep disturbance (occasional). Negative for agitation, self-injury and suicidal ideas. The patient is nervous/anxious.        Past Medical History:   Diagnosis Date    Cancer of tongue (HCC)     Hyperthyroidism      Past Surgical History:   Procedure Laterality Date    VASCULAR SURGERY Bilateral      Family History   Problem Relation Age of Onset    Breast Cancer Mother       Social History     Tobacco Use   Smoking Status Every Day    Current packs/day: 1.00    Average packs/day: 1 pack/day for 54.0 years (54.0 ttl pk-yrs)    Types: Cigarettes   Smokeless Tobacco Never       OBJECTIVE:   Wt Readings from Last 3 Encounters:   05/08/24 62.6 kg (138 lb)   03/25/24 60.8 kg (134 lb)   02/08/24 60.1 kg (132 lb 6.4 oz)     BP Readings from Last 3 Encounters:   05/08/24 (!) 102/52   03/25/24

## 2024-05-10 ENCOUNTER — HOSPITAL ENCOUNTER (OUTPATIENT)
Dept: GENERAL RADIOLOGY | Facility: HOSPITAL | Age: 69
Discharge: HOME | End: 2024-05-10
Attending: INTERNAL MEDICINE
Payer: MEDICARE

## 2024-05-10 ENCOUNTER — HOSPITAL ENCOUNTER (OUTPATIENT)
Facility: HOSPITAL | Age: 69
Discharge: HOME | End: 2024-05-10
Payer: MEDICARE

## 2024-05-10 DIAGNOSIS — M54.42 ACUTE BILATERAL LOW BACK PAIN WITH LEFT-SIDED SCIATICA: ICD-10-CM

## 2024-05-10 PROCEDURE — 72110 X-RAY EXAM L-2 SPINE 4/>VWS: CPT

## 2024-05-11 RX ORDER — ERGOCALCIFEROL 1.25 MG/1
1 CAPSULE ORAL WEEKLY
Qty: 12 CAPSULE | Refills: 1 | Status: SHIPPED | OUTPATIENT
Start: 2024-05-11

## 2024-05-20 RX ORDER — HYDROXYZINE PAMOATE 50 MG/1
50 CAPSULE ORAL NIGHTLY PRN
Qty: 30 CAPSULE | Refills: 3 | Status: SHIPPED | OUTPATIENT
Start: 2024-05-20

## 2024-05-21 RX ORDER — TIZANIDINE 2 MG/1
2 TABLET ORAL 3 TIMES DAILY PRN
Qty: 45 TABLET | Refills: 1 | OUTPATIENT
Start: 2024-05-21

## 2024-05-28 DIAGNOSIS — G62.9 NEUROPATHY: ICD-10-CM

## 2024-05-28 RX ORDER — GABAPENTIN 300 MG/1
300 CAPSULE ORAL 3 TIMES DAILY
Qty: 90 CAPSULE | Refills: 5 | Status: SHIPPED | OUTPATIENT
Start: 2024-05-28

## 2024-05-28 NOTE — TELEPHONE ENCOUNTER
Rx Refill Note  Requested Prescriptions     Pending Prescriptions Disp Refills    gabapentin (NEURONTIN) 300 MG capsule [Pharmacy Med Name: GABAPENTIN 300 MG CAPS 300 Capsule] 90 capsule 1     Sig: TAKE 1 CAPSULE BY MOUTH 3 TIMES A DAY.      Last filled:1/9/24 270 with 1 refill.  Last office visit with prescribing clinician: 1/9/2024      Next office visit with prescribing clinician: Visit date not found     Vane Mustafa MA  05/28/24, 09:32 EDT

## 2024-06-12 DIAGNOSIS — F41.9 ANXIETY: ICD-10-CM

## 2024-06-17 RX ORDER — ALPRAZOLAM 0.25 MG/1
0.25 TABLET ORAL 2 TIMES DAILY PRN
Qty: 40 TABLET | Refills: 0 | Status: SHIPPED | OUTPATIENT
Start: 2024-06-17 | End: 2024-07-17

## 2024-06-17 RX ORDER — TIZANIDINE 2 MG/1
2 TABLET ORAL 3 TIMES DAILY PRN
Qty: 45 TABLET | Refills: 1 | Status: SHIPPED | OUTPATIENT
Start: 2024-06-17

## 2024-06-27 ENCOUNTER — OFFICE VISIT (OUTPATIENT)
Dept: CARDIOLOGY | Facility: CLINIC | Age: 69
End: 2024-06-27
Payer: MEDICARE

## 2024-06-27 VITALS
BODY MASS INDEX: 20.44 KG/M2 | HEART RATE: 64 BPM | DIASTOLIC BLOOD PRESSURE: 42 MMHG | OXYGEN SATURATION: 93 % | SYSTOLIC BLOOD PRESSURE: 128 MMHG | WEIGHT: 138 LBS | HEIGHT: 69 IN

## 2024-06-27 DIAGNOSIS — I44.7 LBBB (LEFT BUNDLE BRANCH BLOCK): ICD-10-CM

## 2024-06-27 DIAGNOSIS — I10 BENIGN HYPERTENSION: Primary | ICD-10-CM

## 2024-06-27 DIAGNOSIS — E78.2 MIXED HYPERLIPIDEMIA: ICD-10-CM

## 2024-06-27 DIAGNOSIS — I73.9 PVD (PERIPHERAL VASCULAR DISEASE) WITH CLAUDICATION: ICD-10-CM

## 2024-06-27 DIAGNOSIS — I65.23 BILATERAL CAROTID ARTERY STENOSIS: ICD-10-CM

## 2024-06-27 RX ORDER — TIZANIDINE 2 MG/1
2 TABLET ORAL EVERY 8 HOURS PRN
COMMUNITY
Start: 2024-06-17

## 2024-06-27 NOTE — PROGRESS NOTES
[facility]  Office visit  Katiana High  1955  [phone]  There is no work phone number on file.    VISIT DATE:  6/27/2024    PCP: Meron Hackett MD  [address] [place] [id number]    CC:  Chief Complaint   Patient presents with    Benign hypertension    PVD (peripheral vascular disease) with claudication    Hypertension       Previous cardiac studies and procedures:  June 2021 myocardial perfusion imaging  · Lexiscan Stress myocardial perfusion scan within normal limits.   · No evidence of ischemia.   · No evidence of prior myocardial injury.   · Left ventricular systolic function c/w LBBB ( LVEF 45% ).   March 2022: Aortobifemoral bypass.  April 2022 ABIs: Right 0.66.  Left 0.53.    November 2022  TTE   Ejection fraction is visually estimated to be 55-60 %.    Diastolic filling parameters suggests grade I diastolic dysfunction .   Bilateral carotid duplex  50 to 69% bilaterally  *  Spectral analysis of the Right internal carotid artery reveals peak systolic velocity 234 cm/s with end-diastolic velocity of 37 cm/s.   *  Spectral analysis of the Left internal carotid demonstrates peak systolic velocity of 197 cm/s with end-diastolic velocity 45 cm/s.   CTA abdominal aorta with runoffs bilaterally  1. Patent aortobifemoral bypass graft, with a questionable borderline  angiographically significant stenosis of the right femoral anastomosis.  Correlation with duplex bypass graft scanning may prove useful here.  2. Multilevel bilateral infrainguinal arterial occlusive disease, worse  on the right.  3. Moderate grade ostial stenosis, right main renal artery. Recommend  clinical correlation.    December 2023 CT abdominal aorta  Abdomen and Pelvis (vascular):   1.  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     Right Lower Extremity:   1.  Severe stenosis is present throughout the superficial  femoral artery with   near complete occlusion of the distal superficial femoral artery.   2.  Mild to moderate atherosclerotic disease in the proximal anterior tibial   artery     Left Lower Extremity:   1.  Moderate to severe stenosis throughout the superficial femoral artery.   2.  Mild to moderate stenosis in the popliteal artery.     ASSESSMENT:   Diagnosis Plan   1. Benign hypertension        2. Bilateral carotid artery stenosis  Duplex Carotid Ultrasound CAR      3. Mixed hyperlipidemia        4. LBBB (left bundle branch block)        5. PVD (peripheral vascular disease) with claudication                PLAN:  Peripheral vascular disease: Currently without limiting claudication.  Continue aspirin, clopidogrel, high intensity statin therapy and afterload reduction.  Encourage continued regular exercise.    Suspect right innominate artery stenosis based on exam, currently asymptomatic.    Left bundle branch block with associated dyspnea on exertion: Echo revealing normal myocardial structure and function.    Bilateral carotid artery stenosis, moderate: Continue aspirin and high intensity statin therapy.  Duplex surveillance every 1 to 2 years.     Hypertension: Well-controlled at home.  Goal blood pressure less than 130/80 mmHg.  Continue current medical therapy.     Hyperlipidemia: Goal LDL less than 70.  Continue rosuvastatin 40 mg p.o. daily and Zetia 10 mg p.o. daily.     Nicotine abuse: Counseled on need for smoking cessation.  She is currently not ready to quit.      Subjective  Interval assessment: No change in baseline functional capacity.  Continues to smoke.  Blood pressures running less than 140/90 mmHg.  She is compliant with medical therapy. .    Initial evaluation: 67-year-old female, active smoker, a pack to 2 packs/day for the past 50 years.  History of peripheral vascular disease, left bundle branch block, oral cancer status postsurgical resection, dyslipidemia and hypertension.  She reports her  "blood pressures at home are less than 130/80 mmHg.  She describes weakness in her legs bilaterally with such activities as walking up stairs or walking up an incline.  Left sided weakness is worse than right.  Compliant with medical therapy.  She describes having a lower extremity peripheral vascular intervention which was not successful and then her follow-up aortobifemoral bypass in March of this year.  Both procedures [facility] in [employer].    PHYSICAL EXAMINATION:  Vitals:    06/27/24 1356   BP: 128/42   BP Location: Right arm   Patient Position: Sitting   Pulse: 64   SpO2: 93%   Weight: 62.6 kg (138 lb)   Height: 175.3 cm (69\")     General Appearance:    Alert, cooperative, no distress, appears stated age   Head:    Normocephalic, without obvious abnormality, atraumatic   Eyes:    conjunctiva/corneas clear   Nose:   Nares normal, septum midline, mucosa normal, no drainage   Throat:   Lips, teeth and gums normal   Neck:   Supple, symmetrical, trachea midline, bilateral carotid bruits.   Lungs:    Diminished throughout bilaterally, respirations unlabored   Chest Wall:    No tenderness or deformity, bruit overlying the right mid clavicular region.    Heart:    Regular rate and rhythm, S1 and S2 normal, 2/6 early peaking systolic murmur right upper sternal border, no rub   or gallop, normal carotid impulse bilaterally without bruit.   Abdomen:     Soft, non-tender   Extremities:   Extremities normal, atraumatic, no cyanosis or edema   Pulses:   2+ and symmetric all extremities.  Right femoral artery bruit   Skin:   Skin color, texture, turgor normal, no rashes or lesions       Diagnostic Data:  Procedures  Lab Results   Component Value Date    CHLPL 130 08/02/2023    TRIG 153 08/02/2023    HDL 45 08/02/2023     Lab Results   Component Value Date    GLUCOSE 93 01/09/2024    BUN 14 01/09/2024    CREATININE 0.95 01/09/2024     01/09/2024    K 4.0 01/09/2024     01/09/2024    CO2 " "24.0 01/09/2024     No results found for: \"HGBA1C\"  Lab Results   Component Value Date    WBC 6.7 03/25/2024    HGB 14.7 03/25/2024    HCT 45.2 03/25/2024     03/25/2024       Allergies  No Known Allergies    Current Medications    Current Outpatient Medications:     aspirin 81 MG EC tablet, Take 1 tablet by mouth Daily., Disp: , Rfl:     atenolol (TENORMIN) 100 MG tablet, Take 1 tablet by mouth Daily., Disp: , Rfl:     busPIRone (BUSPAR) 30 MG tablet, Take 1 tablet by mouth 2 (Two) Times a Day., Disp: , Rfl:     Calcium Polycarbophil (FIBERCON PO), Take 1 tablet by mouth As Needed., Disp: , Rfl:     clopidogrel (PLAVIX) 75 MG tablet, TAKE 1 TABLET BY MOUTH DAILY., Disp: 90 tablet, Rfl: 3    ezetimibe (ZETIA) 10 MG tablet, Take 1 tablet by mouth Daily. Needs liver function test for further refills., Disp: 90 tablet, Rfl: 0    gabapentin (NEURONTIN) 300 MG capsule, TAKE 1 CAPSULE BY MOUTH 3 TIMES A DAY., Disp: 90 capsule, Rfl: 5    hydroCHLOROthiazide (HYDRODIURIL) 12.5 MG tablet, Take 1 tablet by mouth Daily As Needed., Disp: , Rfl:     hydrOXYzine pamoate (VISTARIL) 50 MG capsule, Take 1 capsule by mouth Daily., Disp: , Rfl:     levothyroxine (SYNTHROID, LEVOTHROID) 50 MCG tablet, Take 1 tablet by mouth Daily., Disp: , Rfl:     pyridoxine (VITAMIN B-6) 100 MG tablet, Take 1 tablet by mouth Daily., Disp: , Rfl:     rosuvastatin (CRESTOR) 40 MG tablet, Take 1 tablet by mouth Daily., Disp: , Rfl:     tiZANidine (ZANAFLEX) 2 MG tablet, Take 1 tablet by mouth Every 8 (Eight) Hours As Needed., Disp: , Rfl:     vitamin D (ERGOCALCIFEROL) 1.25 MG (10058 UT) capsule capsule, Take 1 capsule by mouth 1 (One) Time Per Week., Disp: , Rfl:           ROS  ROS      SOCIAL HX  Social History     Socioeconomic History    Marital status:    Tobacco Use    Smoking status: Every Day     Current packs/day: 1.00     Types: Cigarettes    Smokeless tobacco: Never   Vaping Use    Vaping status: Never Used   Substance and " Sexual Activity    Alcohol use: Not Currently    Drug use: Never    Sexual activity: Defer       FAMILY HX  Family History   Problem Relation Age of Onset    Cancer Mother     Cancer Sister              Nino Bates III, MD, FACC

## 2024-07-01 ENCOUNTER — HOSPITAL ENCOUNTER (OUTPATIENT)
Facility: HOSPITAL | Age: 69
Discharge: HOME | End: 2024-07-01
Payer: MEDICARE

## 2024-07-01 DIAGNOSIS — E03.9 HYPOTHYROIDISM, UNSPECIFIED TYPE: ICD-10-CM

## 2024-07-01 DIAGNOSIS — I10 BENIGN HYPERTENSION: ICD-10-CM

## 2024-07-01 DIAGNOSIS — E78.5 HYPERLIPIDEMIA, UNSPECIFIED HYPERLIPIDEMIA TYPE: ICD-10-CM

## 2024-07-01 LAB
ALBUMIN SERPL-MCNC: 4.4 G/DL (ref 3.4–4.8)
ALBUMIN/GLOB SERPL: 1.9 {RATIO} (ref 0.8–2)
ALP SERPL-CCNC: 67 U/L (ref 25–100)
ALT SERPL-CCNC: 14 U/L (ref 4–36)
ANION GAP SERPL CALCULATED.3IONS-SCNC: 9 MMOL/L (ref 3–16)
AST SERPL-CCNC: 21 U/L (ref 8–33)
BASOPHILS # BLD: 0.1 K/UL (ref 0–0.1)
BASOPHILS NFR BLD: 0.8 %
BILIRUB SERPL-MCNC: <0.2 MG/DL (ref 0.3–1.2)
BUN SERPL-MCNC: 14 MG/DL (ref 6–20)
CALCIUM SERPL-MCNC: 9.3 MG/DL (ref 8.5–10.5)
CHLORIDE SERPL-SCNC: 107 MMOL/L (ref 98–107)
CO2 SERPL-SCNC: 27 MMOL/L (ref 20–30)
CREAT SERPL-MCNC: 0.9 MG/DL (ref 0.4–1.2)
EOSINOPHIL # BLD: 0.3 K/UL (ref 0–0.4)
EOSINOPHIL NFR BLD: 3.9 %
ERYTHROCYTE [DISTWIDTH] IN BLOOD BY AUTOMATED COUNT: 14.5 % (ref 11–16)
GFR SERPLBLD CREATININE-BSD FMLA CKD-EPI: 69 ML/MIN/{1.73_M2}
GLOBULIN SER CALC-MCNC: 2.3 G/DL
GLUCOSE SERPL-MCNC: 86 MG/DL (ref 74–106)
HCT VFR BLD AUTO: 43.3 % (ref 37–47)
HGB BLD-MCNC: 13.8 G/DL (ref 11.5–16.5)
IMM GRANULOCYTES # BLD: 0 K/UL
IMM GRANULOCYTES NFR BLD: 0.1 % (ref 0–5)
LYMPHOCYTES # BLD: 2.5 K/UL (ref 1.5–4)
LYMPHOCYTES NFR BLD: 34.8 %
MAGNESIUM SERPL-MCNC: 2.3 MG/DL (ref 1.7–2.4)
MCH RBC QN AUTO: 28.9 PG (ref 27–32)
MCHC RBC AUTO-ENTMCNC: 31.9 G/DL (ref 31–35)
MCV RBC AUTO: 90.6 FL (ref 80–100)
MONOCYTES # BLD: 0.7 K/UL (ref 0.2–0.8)
MONOCYTES NFR BLD: 10.2 %
NEUTROPHILS # BLD: 3.6 K/UL (ref 2–7.5)
NEUTS SEG NFR BLD: 50.2 %
PLATELET # BLD AUTO: 208 K/UL (ref 150–400)
PMV BLD AUTO: 9.8 FL (ref 6–10)
POTASSIUM SERPL-SCNC: 4.4 MMOL/L (ref 3.4–5.1)
PROT SERPL-MCNC: 6.7 G/DL (ref 6.4–8.3)
RBC # BLD AUTO: 4.78 M/UL (ref 3.8–5.8)
SODIUM SERPL-SCNC: 143 MMOL/L (ref 136–145)
TSH SERPL DL<=0.005 MIU/L-ACNC: 2.76 UIU/ML (ref 0.27–4.2)
WBC # BLD AUTO: 7.1 K/UL (ref 4–11)

## 2024-07-01 PROCEDURE — 84443 ASSAY THYROID STIM HORMONE: CPT

## 2024-07-01 PROCEDURE — 85025 COMPLETE CBC W/AUTO DIFF WBC: CPT

## 2024-07-01 PROCEDURE — 83735 ASSAY OF MAGNESIUM: CPT

## 2024-07-01 PROCEDURE — 80053 COMPREHEN METABOLIC PANEL: CPT

## 2024-07-01 PROCEDURE — 36415 COLL VENOUS BLD VENIPUNCTURE: CPT

## 2024-07-11 ENCOUNTER — OFFICE VISIT (OUTPATIENT)
Dept: PRIMARY CARE CLINIC | Age: 69
End: 2024-07-11
Payer: MEDICARE

## 2024-07-11 VITALS
OXYGEN SATURATION: 95 % | WEIGHT: 135 LBS | BODY MASS INDEX: 19.94 KG/M2 | SYSTOLIC BLOOD PRESSURE: 126 MMHG | DIASTOLIC BLOOD PRESSURE: 64 MMHG | RESPIRATION RATE: 18 BRPM | HEART RATE: 66 BPM

## 2024-07-11 DIAGNOSIS — Z23 NEED FOR DIPHTHERIA-TETANUS-PERTUSSIS (TDAP) VACCINE: ICD-10-CM

## 2024-07-11 DIAGNOSIS — E03.9 HYPOTHYROIDISM, UNSPECIFIED TYPE: ICD-10-CM

## 2024-07-11 DIAGNOSIS — E78.5 HYPERLIPIDEMIA, UNSPECIFIED HYPERLIPIDEMIA TYPE: Primary | ICD-10-CM

## 2024-07-11 DIAGNOSIS — Z23 NEED FOR PROPHYLACTIC VACCINATION AND INOCULATION AGAINST VARICELLA: ICD-10-CM

## 2024-07-11 DIAGNOSIS — F41.9 ANXIETY: ICD-10-CM

## 2024-07-11 DIAGNOSIS — I10 BENIGN HYPERTENSION: ICD-10-CM

## 2024-07-11 PROCEDURE — 99213 OFFICE O/P EST LOW 20 MIN: CPT | Performed by: INTERNAL MEDICINE

## 2024-07-11 PROCEDURE — 3074F SYST BP LT 130 MM HG: CPT | Performed by: INTERNAL MEDICINE

## 2024-07-11 PROCEDURE — 3078F DIAST BP <80 MM HG: CPT | Performed by: INTERNAL MEDICINE

## 2024-07-11 PROCEDURE — 1123F ACP DISCUSS/DSCN MKR DOCD: CPT | Performed by: INTERNAL MEDICINE

## 2024-07-11 SDOH — ECONOMIC STABILITY: FOOD INSECURITY: WITHIN THE PAST 12 MONTHS, THE FOOD YOU BOUGHT JUST DIDN'T LAST AND YOU DIDN'T HAVE MONEY TO GET MORE.: NEVER TRUE

## 2024-07-11 SDOH — ECONOMIC STABILITY: FOOD INSECURITY: WITHIN THE PAST 12 MONTHS, YOU WORRIED THAT YOUR FOOD WOULD RUN OUT BEFORE YOU GOT MONEY TO BUY MORE.: NEVER TRUE

## 2024-07-11 SDOH — ECONOMIC STABILITY: INCOME INSECURITY: HOW HARD IS IT FOR YOU TO PAY FOR THE VERY BASICS LIKE FOOD, HOUSING, MEDICAL CARE, AND HEATING?: NOT VERY HARD

## 2024-07-11 NOTE — PROGRESS NOTES
Chief Complaint   Patient presents with    Hypertension    Hyperlipidemia    Insomnia       Have you seen any other physician or provider since your last visit yes - dr ballesteros    Have you had any other diagnostic tests since your last visit? no    Have you changed or stopped any medications since your last visit? no     
kg (138 lb)   03/25/24 60.8 kg (134 lb)     BP Readings from Last 3 Encounters:   07/11/24 126/64   05/08/24 (!) 102/52   03/25/24 124/67       /64   Pulse 66   Resp 18   Wt 61.2 kg (135 lb)   LMP 07/01/2002 (Approximate)   SpO2 95%   BMI 19.94 kg/m²      Physical Exam  Vitals and nursing note reviewed.   Constitutional:       Appearance: Normal appearance. She is well-developed.      Comments: Using cane  Slow gait   HENT:      Head: Normocephalic and atraumatic.      Right Ear: External ear normal.      Left Ear: External ear normal.      Nose: Nose normal.      Mouth/Throat:      Mouth: Mucous membranes are moist.      Pharynx: Oropharynx is clear.   Eyes:      Conjunctiva/sclera: Conjunctivae normal.      Pupils: Pupils are equal, round, and reactive to light.   Neck:      Thyroid: No thyromegaly.      Vascular: No JVD.   Cardiovascular:      Rate and Rhythm: Normal rate and regular rhythm.      Heart sounds: Normal heart sounds.   Pulmonary:      Effort: Pulmonary effort is normal.      Breath sounds: Normal breath sounds. No wheezing or rales.   Abdominal:      General: Bowel sounds are normal. There is no distension.      Palpations: Abdomen is soft.      Tenderness: There is no abdominal tenderness.   Musculoskeletal:         General: No tenderness.      Cervical back: Neck supple. No rigidity. No muscular tenderness.      Right lower leg: No edema.      Left lower leg: No edema.   Skin:     Findings: No erythema or rash.   Neurological:      General: No focal deficit present.      Mental Status: She is alert and oriented to person, place, and time.   Psychiatric:         Behavior: Behavior normal.         Judgment: Judgment normal.         Lab Results   Component Value Date/Time     07/01/2024 07:58 AM    K 4.4 07/01/2024 07:58 AM     07/01/2024 07:58 AM    CO2 27 07/01/2024 07:58 AM    GLUCOSE 86 07/01/2024 07:58 AM    BUN 14 07/01/2024 07:58 AM    CREATININE 0.9 07/01/2024 07:58 AM

## 2024-07-22 RX ORDER — LEVOTHYROXINE SODIUM 0.05 MG/1
50 TABLET ORAL DAILY
Qty: 90 TABLET | Refills: 1 | Status: SHIPPED | OUTPATIENT
Start: 2024-07-22

## 2024-08-05 RX ORDER — BUSPIRONE HYDROCHLORIDE 30 MG/1
TABLET ORAL
Qty: 60 TABLET | Refills: 3 | Status: SHIPPED | OUTPATIENT
Start: 2024-08-05

## 2024-08-19 RX ORDER — EZETIMIBE 10 MG/1
10 TABLET ORAL DAILY
Qty: 30 TABLET | Refills: 5 | Status: SHIPPED | OUTPATIENT
Start: 2024-08-19

## 2024-08-27 RX ORDER — CLOPIDOGREL BISULFATE 75 MG/1
75 TABLET ORAL DAILY
Qty: 90 TABLET | Refills: 3 | Status: SHIPPED | OUTPATIENT
Start: 2024-08-27

## 2024-09-06 ENCOUNTER — HOSPITAL ENCOUNTER (OUTPATIENT)
Dept: MAMMOGRAPHY | Facility: HOSPITAL | Age: 69
Discharge: HOME | End: 2024-09-06
Payer: MEDICARE

## 2024-09-06 VITALS — WEIGHT: 135 LBS | HEIGHT: 69 IN | BODY MASS INDEX: 19.99 KG/M2

## 2024-09-06 DIAGNOSIS — Z12.31 VISIT FOR SCREENING MAMMOGRAM: ICD-10-CM

## 2024-09-06 PROCEDURE — 77063 BREAST TOMOSYNTHESIS BI: CPT

## 2024-09-16 RX ORDER — HYDROXYZINE PAMOATE 50 MG/1
50 CAPSULE ORAL NIGHTLY PRN
Qty: 30 CAPSULE | Refills: 3 | Status: SHIPPED | OUTPATIENT
Start: 2024-09-16

## 2024-09-16 RX ORDER — TIZANIDINE 2 MG/1
2 TABLET ORAL 3 TIMES DAILY PRN
Qty: 45 TABLET | Refills: 1 | Status: SHIPPED | OUTPATIENT
Start: 2024-09-16

## 2024-10-07 RX ORDER — ATENOLOL 100 MG/1
100 TABLET ORAL DAILY
Qty: 30 TABLET | Refills: 1 | Status: SHIPPED | OUTPATIENT
Start: 2024-10-07

## 2024-10-14 ENCOUNTER — OFFICE VISIT (OUTPATIENT)
Dept: PRIMARY CARE CLINIC | Age: 69
End: 2024-10-14

## 2024-10-14 VITALS
HEART RATE: 65 BPM | BODY MASS INDEX: 20.14 KG/M2 | OXYGEN SATURATION: 97 % | SYSTOLIC BLOOD PRESSURE: 134 MMHG | RESPIRATION RATE: 18 BRPM | WEIGHT: 136 LBS | HEIGHT: 69 IN | DIASTOLIC BLOOD PRESSURE: 64 MMHG

## 2024-10-14 DIAGNOSIS — Z00.00 MEDICARE ANNUAL WELLNESS VISIT, SUBSEQUENT: Primary | ICD-10-CM

## 2024-10-14 DIAGNOSIS — E03.9 HYPOTHYROIDISM, UNSPECIFIED TYPE: ICD-10-CM

## 2024-10-14 DIAGNOSIS — E78.5 HYPERLIPIDEMIA, UNSPECIFIED HYPERLIPIDEMIA TYPE: ICD-10-CM

## 2024-10-14 DIAGNOSIS — I10 BENIGN HYPERTENSION: ICD-10-CM

## 2024-10-14 DIAGNOSIS — F41.9 ANXIETY: ICD-10-CM

## 2024-10-14 DIAGNOSIS — Z87.891 PERSONAL HISTORY OF TOBACCO USE, PRESENTING HAZARDS TO HEALTH: ICD-10-CM

## 2024-10-14 RX ORDER — DIAZEPAM 2 MG
2 TABLET ORAL NIGHTLY PRN
Qty: 30 TABLET | Refills: 0 | Status: SHIPPED | OUTPATIENT
Start: 2024-10-14 | End: 2024-11-13

## 2024-10-14 RX ORDER — GABAPENTIN 300 MG/1
300 CAPSULE ORAL 3 TIMES DAILY
COMMUNITY
End: 2024-10-14

## 2024-10-14 RX ORDER — GUAIFENESIN 600 MG/1
600 TABLET, EXTENDED RELEASE ORAL 2 TIMES DAILY
Qty: 30 TABLET | Refills: 0 | Status: SHIPPED | OUTPATIENT
Start: 2024-10-14 | End: 2024-10-29

## 2024-10-14 ASSESSMENT — LIFESTYLE VARIABLES
HOW OFTEN DO YOU HAVE A DRINK CONTAINING ALCOHOL: NEVER
HOW MANY STANDARD DRINKS CONTAINING ALCOHOL DO YOU HAVE ON A TYPICAL DAY: PATIENT DOES NOT DRINK

## 2024-10-14 ASSESSMENT — PATIENT HEALTH QUESTIONNAIRE - PHQ9
SUM OF ALL RESPONSES TO PHQ QUESTIONS 1-9: 2
SUM OF ALL RESPONSES TO PHQ QUESTIONS 1-9: 2
SUM OF ALL RESPONSES TO PHQ9 QUESTIONS 1 & 2: 2
2. FEELING DOWN, DEPRESSED OR HOPELESS: SEVERAL DAYS
SUM OF ALL RESPONSES TO PHQ QUESTIONS 1-9: 2
SUM OF ALL RESPONSES TO PHQ QUESTIONS 1-9: 2
1. LITTLE INTEREST OR PLEASURE IN DOING THINGS: SEVERAL DAYS

## 2024-10-14 NOTE — PROGRESS NOTES
Medicare Annual Wellness Visit  Name: Shirley Saravia Today’s Date: 10/14/2024   MRN: [id number] Sex: Female   Age: 69 y.o. Ethnicity: Non- / Non    : 1955 Race: White (non-)      Shirley Saravia is here for Medicare AWV    Screenings for behavioral, psychosocial and functional/safety risks, and cognitive dysfunction are all negative except as indicated below. These results, as well as other patient data from the Health Risk Assessment form, are documented in Flowsheets linked to this Encounter.    No Known Allergies    Prior to Visit Medications    Medication Sig Taking? Authorizing Provider   atenolol (TENORMIN) 100 MG tablet TAKE 1 TABLET BY MOUTH DAILY Yes Fco Waldron MD   hydrOXYzine pamoate (VISTARIL) 50 MG capsule TAKE 1 CAPSULE BY MOUTH NIGHTLY AS NEEDED FOR ITCHING Yes Fco Waldron MD   tiZANidine (ZANAFLEX) 2 MG tablet TAKE 1 TABLET BY MOUTH 3 TIMES DAILY AS NEEDED (PAIN AND SPASMS) Yes Fco Waldron MD   busPIRone (BUSPAR) 30 MG tablet TAKE 1 TABLET BY MOUTH IN THE MORNING AND AT BEDTIME Yes Fco Waldron MD   levothyroxine (SYNTHROID) 50 MCG tablet TAKE 1 TABLET BY MOUTH DAILY Yes Fco Waldron MD   Vitamin D, Ergocalciferol, 65441 units CAPS TAKE 1 CAPSULE BY MOUTH ONCE A WEEK Yes Fco Waldron MD   cetirizine (ZYRTEC) 10 MG tablet Take 1 tablet by mouth daily Yes Noelle Arana MD   hydroCHLOROthiazide 12.5 MG tablet TAKE 1 TABLET BY MOUTH DAILY AS NEEDED (SWELLING) Yes Fco Waldron MD   gabapentin (NEURONTIN) 300 MG capsule Take 1 capsule by mouth 3 times daily. Yes Brynn Morales MD   rosuvastatin (CRESTOR) 40 MG tablet TAKE 1 TABLET BY MOUTH DAILY Yes Fco Waldron MD   ezetimibe (ZETIA) 10 MG tablet Take 1 tablet by mouth daily Yes Brynn Morales MD   clopidogrel (PLAVIX) 75 MG tablet Take 1 tablet by mouth daily Yes Brynn Morales MD   aspirin 81 MG EC tablet Take 1 tablet by mouth daily Yes Fco Waldron MD   pyridoxine (B-6) 100 MG

## 2024-10-14 NOTE — PATIENT INSTRUCTIONS
balance while you stand or walk.  Move from sitting to standing or from a bed to a chair.  Button or unbutton a shirt or sweater.  Remove and put on your shoes.  It's common to feel a little worried or anxious if you find you can't do all the things you used to be able to do. Talking with your doctor about ADLs is a way to make sure you're as safe as possible and able to care for yourself as well as you can. You may want to bring a caregiver, friend, or family member to your checkup. They can help you talk to your doctor.  Follow-up care is a key part of your treatment and safety. Be sure to make and go to all appointments, and call your doctor if you are having problems. It's also a good idea to know your test results and keep a list of the medicines you take.  Current as of: October 24, 2023  Content Version: 14.2  © 2024 [employer].   Care instructions adapted under license by [employer]. If you have questions about a medical condition or this instruction, always ask your healthcare professional. [employer] disclaims any warranty or liability for your use of this information.           Learning About Lung Cancer Screening  What is screening for lung cancer?     Lung cancer screening is a way to find some lung cancers early, before a person has any symptoms of the cancer.  Lung cancer screening may help those who have the highest risk for lung cancer--people age 50 and older who are or were heavy smokers. For most people, who aren't at increased risk, screening for lung cancer probably isn't helpful.  Screening won't prevent cancer. And it may not find all lung cancers. Lung cancer screening may lower the risk of dying from lung cancer in a small number of people.  How is it done?  Lung cancer screening is done with a low-dose CT (computed tomography) scan. A CT scan uses X-rays, or radiation, to make detailed pictures of your body. Experts recommend that screening be done in medical

## 2024-10-24 RX ORDER — ERGOCALCIFEROL 1.25 MG/1
1 CAPSULE ORAL WEEKLY
Qty: 12 CAPSULE | Refills: 1 | Status: SHIPPED | OUTPATIENT
Start: 2024-10-24

## 2024-11-18 ENCOUNTER — HOSPITAL ENCOUNTER (OUTPATIENT)
Facility: HOSPITAL | Age: 69
Discharge: HOME | End: 2024-11-18
Payer: MEDICARE

## 2024-11-18 DIAGNOSIS — G62.9 NEUROPATHY: ICD-10-CM

## 2024-11-18 LAB
ALBUMIN SERPL-MCNC: 4.3 G/DL (ref 3.4–4.8)
ALBUMIN/GLOB SERPL: 1.6 {RATIO} (ref 0.8–2)
ALP SERPL-CCNC: 67 U/L (ref 25–100)
ALT SERPL-CCNC: 11 U/L (ref 4–36)
ANION GAP SERPL CALCULATED.3IONS-SCNC: 12 MMOL/L (ref 3–16)
AST SERPL-CCNC: 19 U/L (ref 8–33)
BASOPHILS # BLD: 0.1 K/UL (ref 0–0.1)
BASOPHILS NFR BLD: 1 %
BILIRUB SERPL-MCNC: 0.3 MG/DL (ref 0.3–1.2)
BUN SERPL-MCNC: 14 MG/DL (ref 6–20)
CALCIUM SERPL-MCNC: 9.7 MG/DL (ref 8.5–10.5)
CHLORIDE SERPL-SCNC: 107 MMOL/L (ref 98–107)
CHOLEST SERPL-MCNC: 137 MG/DL (ref 0–200)
CO2 SERPL-SCNC: 26 MMOL/L (ref 20–30)
CREAT SERPL-MCNC: 0.9 MG/DL (ref 0.4–1.2)
EOSINOPHIL # BLD: 0.3 K/UL (ref 0–0.4)
EOSINOPHIL NFR BLD: 3 %
ERYTHROCYTE [DISTWIDTH] IN BLOOD BY AUTOMATED COUNT: 14.6 % (ref 11–16)
GFR SERPLBLD CREATININE-BSD FMLA CKD-EPI: 69 ML/MIN/{1.73_M2}
GLOBULIN SER CALC-MCNC: 2.7 G/DL
GLUCOSE SERPL-MCNC: 87 MG/DL (ref 74–106)
HCT VFR BLD AUTO: 44.8 % (ref 37–47)
HDLC SERPL-MCNC: 46 MG/DL (ref 40–60)
HGB BLD-MCNC: 14.7 G/DL (ref 11.5–16.5)
IMM GRANULOCYTES # BLD: 0 K/UL
IMM GRANULOCYTES NFR BLD: 0.2 % (ref 0–5)
LDLC SERPL CALC-MCNC: 49 MG/DL
LYMPHOCYTES # BLD: 2.4 K/UL (ref 1.5–4)
LYMPHOCYTES NFR BLD: 28.6 %
MAGNESIUM SERPL-MCNC: 2.2 MG/DL (ref 1.7–2.4)
MCH RBC QN AUTO: 29.1 PG (ref 27–32)
MCHC RBC AUTO-ENTMCNC: 32.8 G/DL (ref 31–35)
MCV RBC AUTO: 88.7 FL (ref 80–100)
MONOCYTES # BLD: 0.7 K/UL (ref 0.2–0.8)
MONOCYTES NFR BLD: 8.9 %
NEUTROPHILS # BLD: 4.8 K/UL (ref 2–7.5)
NEUTS SEG NFR BLD: 58.3 %
PLATELET # BLD AUTO: 207 K/UL (ref 150–400)
PMV BLD AUTO: 10.6 FL (ref 6–10)
POTASSIUM SERPL-SCNC: 4.7 MMOL/L (ref 3.4–5.1)
PROT SERPL-MCNC: 7 G/DL (ref 6.4–8.3)
RBC # BLD AUTO: 5.05 M/UL (ref 3.8–5.8)
SODIUM SERPL-SCNC: 145 MMOL/L (ref 136–145)
TRIGL SERPL-MCNC: 212 MG/DL (ref 0–249)
TSH SERPL DL<=0.005 MIU/L-ACNC: 3.23 UIU/ML (ref 0.27–4.2)
VLDLC SERPL CALC-MCNC: 42 MG/DL
WBC # BLD AUTO: 8.2 K/UL (ref 4–11)

## 2024-11-18 PROCEDURE — 36415 COLL VENOUS BLD VENIPUNCTURE: CPT

## 2024-11-18 PROCEDURE — 85025 COMPLETE CBC W/AUTO DIFF WBC: CPT

## 2024-11-18 PROCEDURE — 84443 ASSAY THYROID STIM HORMONE: CPT

## 2024-11-18 PROCEDURE — 83735 ASSAY OF MAGNESIUM: CPT

## 2024-11-18 PROCEDURE — 80061 LIPID PANEL: CPT

## 2024-11-18 PROCEDURE — 80053 COMPREHEN METABOLIC PANEL: CPT

## 2024-11-18 RX ORDER — HYDROCHLOROTHIAZIDE 12.5 MG/1
12.5 TABLET ORAL DAILY PRN
Qty: 30 TABLET | Refills: 1 | Status: SHIPPED | OUTPATIENT
Start: 2024-11-18

## 2024-11-18 RX ORDER — GABAPENTIN 300 MG/1
300 CAPSULE ORAL 3 TIMES DAILY
Qty: 90 CAPSULE | Refills: 5 | OUTPATIENT
Start: 2024-11-18

## 2024-12-02 RX ORDER — TIZANIDINE 2 MG/1
2 TABLET ORAL 3 TIMES DAILY PRN
Qty: 45 TABLET | Refills: 1 | Status: SHIPPED | OUTPATIENT
Start: 2024-12-02

## 2024-12-09 DIAGNOSIS — F41.9 ANXIETY: ICD-10-CM

## 2024-12-09 RX ORDER — DIAZEPAM 2 MG/1
2 TABLET ORAL NIGHTLY PRN
Qty: 30 TABLET | Refills: 0 | Status: SHIPPED | OUTPATIENT
Start: 2024-12-09 | End: 2025-01-08

## 2024-12-16 DIAGNOSIS — G89.29 CHRONIC MIDLINE LOW BACK PAIN WITHOUT SCIATICA: Primary | ICD-10-CM

## 2024-12-16 DIAGNOSIS — M54.50 CHRONIC MIDLINE LOW BACK PAIN WITHOUT SCIATICA: Primary | ICD-10-CM

## 2024-12-16 DIAGNOSIS — G62.9 NEUROPATHY: ICD-10-CM

## 2024-12-16 RX ORDER — GABAPENTIN 300 MG/1
300 CAPSULE ORAL 3 TIMES DAILY
Qty: 90 CAPSULE | Refills: 5 | OUTPATIENT
Start: 2024-12-16

## 2024-12-16 NOTE — TELEPHONE ENCOUNTER
Rx Refill Note  Requested Prescriptions     Pending Prescriptions Disp Refills    gabapentin (NEURONTIN) 300 MG capsule [Pharmacy Med Name: GABAPENTIN 300 MG CAPS 300 Capsule] 90 capsule 5     Sig: TAKE 1 CAPSULE BY MOUTH 3 TIMES A DAY.      Last filled:   Last office visit with prescribing clinician: 1/9/2024      Next office visit with prescribing clinician: Visit date not found     Patient receiving medication from other provider.    Vane Mustafa MA  12/16/24, 13:03 EST

## 2024-12-17 RX ORDER — GABAPENTIN 300 MG/1
300 CAPSULE ORAL 3 TIMES DAILY
Qty: 90 CAPSULE | Refills: 1 | Status: SHIPPED | OUTPATIENT
Start: 2024-12-17 | End: 2025-03-17

## 2024-12-17 NOTE — TELEPHONE ENCOUNTER
Pt states you had told her that you would start filling Gabapentin. Pt does not have any refills left from previous prescriber (Daniel Byrd in [place])     Barrie printed 12/17/2024 on desk  2 mth f/u 1/7/25

## 2024-12-23 RX ORDER — BUSPIRONE HYDROCHLORIDE 30 MG/1
TABLET ORAL
Qty: 60 TABLET | Refills: 3 | Status: SHIPPED | OUTPATIENT
Start: 2024-12-23

## 2025-01-07 RX ORDER — ATENOLOL 100 MG/1
100 TABLET ORAL DAILY
Qty: 30 TABLET | Refills: 1 | Status: SHIPPED | OUTPATIENT
Start: 2025-01-07

## 2025-01-10 RX ORDER — HYDROCHLOROTHIAZIDE 12.5 MG/1
12.5 TABLET ORAL DAILY PRN
Qty: 30 TABLET | Refills: 1 | Status: SHIPPED | OUTPATIENT
Start: 2025-01-10

## 2025-01-10 RX ORDER — HYDROXYZINE PAMOATE 50 MG/1
50 CAPSULE ORAL NIGHTLY PRN
Qty: 30 CAPSULE | Refills: 3 | Status: SHIPPED | OUTPATIENT
Start: 2025-01-10

## 2025-01-20 DIAGNOSIS — G89.29 CHRONIC MIDLINE LOW BACK PAIN WITHOUT SCIATICA: ICD-10-CM

## 2025-01-20 DIAGNOSIS — F41.9 ANXIETY: ICD-10-CM

## 2025-01-20 DIAGNOSIS — M54.50 CHRONIC MIDLINE LOW BACK PAIN WITHOUT SCIATICA: ICD-10-CM

## 2025-01-20 RX ORDER — DIAZEPAM 2 MG/1
2 TABLET ORAL NIGHTLY PRN
Qty: 30 TABLET | Refills: 0 | Status: SHIPPED | OUTPATIENT
Start: 2025-01-20 | End: 2025-02-19

## 2025-01-20 RX ORDER — GABAPENTIN 300 MG/1
CAPSULE ORAL
Qty: 90 CAPSULE | Refills: 1 | OUTPATIENT
Start: 2025-01-20

## 2025-01-27 ENCOUNTER — HOSPITAL ENCOUNTER (OUTPATIENT)
Dept: ULTRASOUND IMAGING | Facility: HOSPITAL | Age: 70
Discharge: HOME | End: 2025-01-27
Payer: MEDICARE

## 2025-01-27 DIAGNOSIS — I65.23 BILATERAL CAROTID ARTERY STENOSIS: ICD-10-CM

## 2025-01-27 PROCEDURE — 93880 EXTRACRANIAL BILAT STUDY: CPT

## 2025-01-28 ENCOUNTER — OFFICE VISIT (OUTPATIENT)
Age: 70
End: 2025-01-28

## 2025-01-28 VITALS
WEIGHT: 136.6 LBS | OXYGEN SATURATION: 96 % | SYSTOLIC BLOOD PRESSURE: 138 MMHG | DIASTOLIC BLOOD PRESSURE: 64 MMHG | BODY MASS INDEX: 20.17 KG/M2 | HEART RATE: 58 BPM | RESPIRATION RATE: 18 BRPM

## 2025-01-28 DIAGNOSIS — F41.9 ANXIETY: ICD-10-CM

## 2025-01-28 DIAGNOSIS — I65.23 BILATERAL CAROTID ARTERY OCCLUSION: ICD-10-CM

## 2025-01-28 DIAGNOSIS — Z87.891 PERSONAL HISTORY OF TOBACCO USE, PRESENTING HAZARDS TO HEALTH: ICD-10-CM

## 2025-01-28 DIAGNOSIS — I10 BENIGN HYPERTENSION: ICD-10-CM

## 2025-01-28 DIAGNOSIS — E03.9 HYPOTHYROIDISM, UNSPECIFIED TYPE: ICD-10-CM

## 2025-01-28 DIAGNOSIS — E78.5 HYPERLIPIDEMIA, UNSPECIFIED HYPERLIPIDEMIA TYPE: Primary | ICD-10-CM

## 2025-01-28 RX ORDER — LIFITEGRAST 50 MG/ML
SOLUTION/ DROPS OPHTHALMIC
COMMUNITY
Start: 2024-12-27

## 2025-01-28 ASSESSMENT — ENCOUNTER SYMPTOMS
SINUS PRESSURE: 0
SHORTNESS OF BREATH: 0
NAUSEA: 0
EYE DISCHARGE: 0
ABDOMINAL PAIN: 0
SORE THROAT: 0
WHEEZING: 0
COUGH: 0
VOMITING: 0

## 2025-01-28 NOTE — PATIENT INSTRUCTIONS
for you. If you like to smoke with others, smoke alone. Turn your chair to an empty corner and focus only on the cigarette you are smoking and all its many negative effects.   Collect all your cigarette butts in one large glass container as a visual reminder of the filth made by smoking.   Just Before Quitting   Practice going without cigarettes.   Don't think of never smoking again. Think of quitting in terms of one day at a time .   Tell yourself you won't smoke today, and then don't.   Clean your clothes to rid them of the cigarette smell, which can linger a long time.   On the Day You Quit   Throw away all your cigarettes and matches. Hide your lighters and ashtrays.   Visit the dentist and have your teeth cleaned to get rid of tobacco stains. Notice how nice they look and resolve to keep them that way.   Make a list of things you'd like to buy for yourself or someone else. Estimate the cost in terms of packs of cigarettes, and put the money aside to buy these presents.   Keep very busy on the big day. Go to the movies, exercise, take long walks, or go bike riding.   Remind your family and friends that this is your quit date, and ask them to help you over the rough spots of the first couple of days and weeks.   Buy yourself a treat or do something special to celebrate.   Telephone and Internet Support   Telephone, web-, and computer-based programs can offer you the support that you need to quit and to stay smoke-free. You can find many programs online, like the American Lung Association's [place] from Smoking .   Immediately After Quitting   Develop a clean, fresh, nonsmoking environment around yourselfat work and at home. Buy yourself flowersyou may be surprised how much you can enjoy their scent now.   The first few days after you quit, spend as much free time as possible in places where smoking isn't allowed, such as libraries, museums, theaters, department stores, and churches.   Drink large quantities of

## 2025-01-28 NOTE — PROGRESS NOTES
Chief Complaint   Patient presents with    Hypertension    Hyperlipidemia    Insomnia       Have you seen any other physician or provider since your last visit yes - eye dr     Have you had any other diagnostic tests since your last visit? no    Have you changed or stopped any medications since your last visit? no       
  01/28/25 138/64   10/14/24 134/64   07/11/24 126/64       /64   Pulse 58   Resp 18   Wt 62 kg (136 lb 9.6 oz)   LMP 07/01/2002 (Approximate)   SpO2 96%   BMI 20.17 kg/m²      Physical Exam  Vitals and nursing note reviewed.   Constitutional:       Appearance: Normal appearance. She is well-developed.   HENT:      Head: Normocephalic and atraumatic.      Right Ear: External ear normal.      Left Ear: External ear normal.      Nose: Nose normal.      Mouth/Throat:      Mouth: Mucous membranes are moist.      Pharynx: Oropharynx is clear.   Eyes:      Conjunctiva/sclera: Conjunctivae normal.      Pupils: Pupils are equal, round, and reactive to light.   Neck:      Thyroid: No thyromegaly.      Vascular: No JVD.   Cardiovascular:      Rate and Rhythm: Normal rate and regular rhythm.      Heart sounds: Normal heart sounds.   Pulmonary:      Effort: Pulmonary effort is normal.      Breath sounds: Normal breath sounds. No wheezing or rales.   Abdominal:      General: Bowel sounds are normal. There is no distension.      Palpations: Abdomen is soft.      Tenderness: There is no abdominal tenderness.   Musculoskeletal:         General: No tenderness.      Cervical back: Neck supple. No rigidity. No muscular tenderness.      Right lower leg: No edema.      Left lower leg: No edema.      Comments: Mild difficulty standing up.  Slow gait.  Using cane.   Skin:     Findings: No erythema or rash.   Neurological:      General: No focal deficit present.      Mental Status: She is alert and oriented to person, place, and time.   Psychiatric:         Behavior: Behavior normal.         Judgment: Judgment normal.         Lab Results   Component Value Date/Time     11/18/2024 09:52 AM    K 4.7 11/18/2024 09:52 AM     11/18/2024 09:52 AM    CO2 26 11/18/2024 09:52 AM    GLUCOSE 87 11/18/2024 09:52 AM    BUN 14 11/18/2024 09:52 AM    CREATININE 0.9 11/18/2024 09:52 AM    CALCIUM 9.7 11/18/2024 09:52 AM    BILITOT

## 2025-01-29 RX ORDER — LEVOTHYROXINE SODIUM 50 UG/1
50 TABLET ORAL DAILY
Qty: 90 TABLET | Refills: 1 | Status: SHIPPED | OUTPATIENT
Start: 2025-01-29

## 2025-01-29 RX ORDER — ROSUVASTATIN CALCIUM 40 MG/1
40 TABLET, COATED ORAL DAILY
Qty: 90 TABLET | Refills: 1 | Status: SHIPPED | OUTPATIENT
Start: 2025-01-29

## 2025-02-10 RX ORDER — EZETIMIBE 10 MG/1
10 TABLET ORAL DAILY
Qty: 30 TABLET | Refills: 0 | Status: SHIPPED | OUTPATIENT
Start: 2025-02-10 | End: 2025-02-13 | Stop reason: SDUPTHER

## 2025-02-11 RX ORDER — ATENOLOL 100 MG/1
100 TABLET ORAL DAILY
Qty: 30 TABLET | Refills: 2 | Status: SHIPPED | OUTPATIENT
Start: 2025-02-11

## 2025-02-13 ENCOUNTER — OFFICE VISIT (OUTPATIENT)
Dept: CARDIOLOGY | Facility: CLINIC | Age: 70
End: 2025-02-13
Payer: MEDICARE

## 2025-02-13 VITALS
HEART RATE: 58 BPM | DIASTOLIC BLOOD PRESSURE: 60 MMHG | OXYGEN SATURATION: 95 % | SYSTOLIC BLOOD PRESSURE: 122 MMHG | HEIGHT: 69 IN | BODY MASS INDEX: 20.23 KG/M2 | WEIGHT: 136.6 LBS

## 2025-02-13 DIAGNOSIS — I65.23 BILATERAL CAROTID ARTERY STENOSIS: ICD-10-CM

## 2025-02-13 DIAGNOSIS — I73.9 PVD (PERIPHERAL VASCULAR DISEASE) WITH CLAUDICATION: ICD-10-CM

## 2025-02-13 DIAGNOSIS — I10 BENIGN HYPERTENSION: ICD-10-CM

## 2025-02-13 DIAGNOSIS — E78.2 MIXED HYPERLIPIDEMIA: Primary | ICD-10-CM

## 2025-02-13 RX ORDER — CETIRIZINE HYDROCHLORIDE 10 MG/1
10 TABLET ORAL DAILY
COMMUNITY

## 2025-02-13 RX ORDER — EZETIMIBE 10 MG/1
10 TABLET ORAL DAILY
Qty: 90 TABLET | Refills: 3 | Status: SHIPPED | OUTPATIENT
Start: 2025-02-13

## 2025-02-13 NOTE — PROGRESS NOTES
[facility] Cardiology  Office visit  Katiana Free  1955  [phone]  There is no work phone number on file.    VISIT DATE:  2/13/2025    PCP: Meron Hackett MD  [address] [place] [id number]    CC:  Chief Complaint   Patient presents with    Benign hypertension       Previous cardiac studies and procedures:  June 2021 myocardial perfusion imaging  · Lexiscan Stress myocardial perfusion scan within normal limits.   · No evidence of ischemia.   · No evidence of prior myocardial injury.   · Left ventricular systolic function c/w LBBB ( LVEF 45% ).   March 2022: Aortobifemoral bypass.  April 2022 ABIs: Right 0.66.  Left 0.53.    November 2022  TTE   Ejection fraction is visually estimated to be 55-60 %.    Diastolic filling parameters suggests grade I diastolic dysfunction .   Bilateral carotid duplex  50 to 69% bilaterally  *  Spectral analysis of the Right internal carotid artery reveals peak systolic velocity 234 cm/s with end-diastolic velocity of 37 cm/s.   *  Spectral analysis of the Left internal carotid demonstrates peak systolic velocity of 197 cm/s with end-diastolic velocity 45 cm/s.   CTA abdominal aorta with runoffs bilaterally  1. Patent aortobifemoral bypass graft, with a questionable borderline  angiographically significant stenosis of the right femoral anastomosis.  Correlation with duplex bypass graft scanning may prove useful here.  2. Multilevel bilateral infrainguinal arterial occlusive disease, worse  on the right.  3. Moderate grade ostial stenosis, right main renal artery. Recommend  clinical correlation.    December 2023 CT abdominal aorta  Abdomen and Pelvis (vascular):   1.  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     Right Lower Extremity:   1.  Severe stenosis is present throughout the superficial femoral artery with   near complete occlusion of the distal superficial  femoral artery.   2.  Mild to moderate atherosclerotic disease in the proximal anterior tibial   artery     Left Lower Extremity:   1.  Moderate to severe stenosis throughout the superficial femoral artery.   2.  Mild to moderate stenosis in the popliteal artery.     January 2025 carotid duplex  1.  Estimated diameter reduction of the right internal carotid artery is 50-69%.   2.  Estimated diameter reduction left internal carotid artery is at the upper   range of 50-69%.   3.  Vertebral arteries bilaterally with antegrade flow.     ASSESSMENT:   Diagnosis Plan   1. Mixed hyperlipidemia        2. PVD (peripheral vascular disease) with claudication        3. Bilateral carotid artery stenosis        4. Benign hypertension                PLAN:  Peripheral vascular disease: Currently without limiting claudication.  Continue aspirin, clopidogrel, high intensity statin therapy and afterload reduction.  Encourage continued regular exercise.    Suspect right innominate artery stenosis based on exam, currently asymptomatic.    Left bundle branch block with associated dyspnea on exertion: Echo revealing normal myocardial structure and function.    Bilateral carotid artery stenosis, moderate: Continue aspirin and high intensity statin therapy.  Duplex surveillance every 1 to 2 years.     Hypertension: Well-controlled at home.  Goal blood pressure less than 130/80 mmHg.  Continue current medical therapy.     Hyperlipidemia: Goal LDL less than 70.  Currently well-controlled.  Continue rosuvastatin 40 mg p.o. daily and Zetia 10 mg p.o. daily.     Nicotine abuse: Counseled on need for smoking cessation.  She is currently not ready to quit.      Subjective  Interval assessment: No change in baseline functional capacity.  Using a cane for ambulation.  Denies limiting claudication, reports intermittent gait instability.  Continues to smoke.  Blood pressures running less than 140/90 mmHg.  She is compliant with medical therapy.  ".    Initial evaluation: 67-year-old female, active smoker, a pack to 2 packs/day for the past 50 years.  History of peripheral vascular disease, left bundle branch block, oral cancer status postsurgical resection, dyslipidemia and hypertension.  She reports her blood pressures at home are less than 130/80 mmHg.  She describes weakness in her legs bilaterally with such activities as walking up stairs or walking up an incline.  Left sided weakness is worse than right.  Compliant with medical therapy.  She describes having a lower extremity peripheral vascular intervention which was not successful and then her follow-up aortobifemoral bypass in March of this year.  Both procedures [facility].    PHYSICAL EXAMINATION:  Vitals:    02/13/25 1326   BP: 122/60   BP Location: Left arm   Patient Position: Sitting   Pulse: 58   SpO2: 95%   Weight: 62 kg (136 lb 9.6 oz)   Height: 175.3 cm (69\")       General Appearance:    Alert, cooperative, no distress, appears stated age   Head:    Normocephalic, without obvious abnormality, atraumatic   Eyes:    conjunctiva/corneas clear   Nose:   Nares normal, septum midline, mucosa normal, no drainage   Throat:   Lips, teeth and gums normal   Neck:   Supple, symmetrical, trachea midline, bilateral carotid bruits.   Lungs:    Diminished throughout bilaterally, respirations unlabored   Chest Wall:    No tenderness or deformity, bruit overlying the right mid clavicular region.    Heart:    Regular rate and rhythm, S1 and S2 normal, 2/6 early peaking systolic murmur right upper sternal border, no rub   or gallop, normal carotid impulse bilaterally without bruit.   Abdomen:     Soft, non-tender   Extremities:   Extremities normal, atraumatic, no cyanosis or edema   Pulses:   2+ and symmetric all extremities.  Right femoral artery bruit   Skin:   Skin color, texture, turgor normal, no rashes or lesions       Diagnostic Data:  Procedures  Lab Results " "  Component Value Date    CHLPL 130 08/02/2023    TRIG 153 08/02/2023    HDL 45 08/02/2023     Lab Results   Component Value Date    GLUCOSE 93 01/09/2024    BUN 14 01/09/2024    CREATININE 0.95 01/09/2024     01/09/2024    K 4.0 01/09/2024     01/09/2024    CO2 24.0 01/09/2024     No results found for: \"HGBA1C\"  Lab Results   Component Value Date    WBC 8.2 11/18/2024    HGB 14.7 11/18/2024    HCT 44.8 11/18/2024     11/18/2024       Allergies  No Known Allergies    Current Medications    Current Outpatient Medications:     aspirin 81 MG EC tablet, Take 1 tablet by mouth Daily., Disp: , Rfl:     atenolol (TENORMIN) 100 MG tablet, Take 1 tablet by mouth Daily., Disp: , Rfl:     busPIRone (BUSPAR) 30 MG tablet, Take 1 tablet by mouth 2 (Two) Times a Day., Disp: , Rfl:     cetirizine (zyrTEC) 10 MG tablet, Take 1 tablet by mouth Daily., Disp: , Rfl:     clopidogrel (PLAVIX) 75 MG tablet, TAKE 1 TABLET BY MOUTH DAILY., Disp: 90 tablet, Rfl: 3    ezetimibe (ZETIA) 10 MG tablet, Take 1 tablet by mouth Daily., Disp: 90 tablet, Rfl: 3    gabapentin (NEURONTIN) 300 MG capsule, TAKE 1 CAPSULE BY MOUTH 3 TIMES A DAY., Disp: 90 capsule, Rfl: 5    hydroCHLOROthiazide (HYDRODIURIL) 12.5 MG tablet, Take 1 tablet by mouth Daily As Needed., Disp: , Rfl:     hydrOXYzine pamoate (VISTARIL) 50 MG capsule, Take 1 capsule by mouth Daily., Disp: , Rfl:     levothyroxine (SYNTHROID, LEVOTHROID) 50 MCG tablet, Take 1 tablet by mouth Daily., Disp: , Rfl:     pyridoxine (VITAMIN B-6) 100 MG tablet, Take 1 tablet by mouth Daily., Disp: , Rfl:     rosuvastatin (CRESTOR) 40 MG tablet, Take 1 tablet by mouth Daily., Disp: , Rfl:     tiZANidine (ZANAFLEX) 2 MG tablet, Take 1 tablet by mouth Every 8 (Eight) Hours As Needed., Disp: , Rfl:     vitamin D (ERGOCALCIFEROL) 1.25 MG (81011 UT) capsule capsule, Take 1 capsule by mouth 1 (One) Time Per Week., Disp: , Rfl:     Calcium Polycarbophil (FIBERCON PO), Take 1 tablet by mouth As " Needed. (Patient not taking: Reported on 2/13/2025), Disp: , Rfl:           ROS  ROS      SOCIAL HX  Social History     Socioeconomic History    Marital status:    Tobacco Use    Smoking status: Every Day     Current packs/day: 1.00     Types: Cigarettes    Smokeless tobacco: Never   Vaping Use    Vaping status: Never Used   Substance and Sexual Activity    Alcohol use: Not Currently    Drug use: Never    Sexual activity: Defer       FAMILY HX  Family History   Problem Relation Age of Onset    Cancer Mother     Cancer Sister              Nino Bates III, MD, FACC

## 2025-02-24 DIAGNOSIS — M54.50 CHRONIC MIDLINE LOW BACK PAIN WITHOUT SCIATICA: ICD-10-CM

## 2025-02-24 DIAGNOSIS — G89.29 CHRONIC MIDLINE LOW BACK PAIN WITHOUT SCIATICA: ICD-10-CM

## 2025-02-24 RX ORDER — GABAPENTIN 300 MG/1
CAPSULE ORAL
Qty: 90 CAPSULE | Refills: 1 | Status: SHIPPED | OUTPATIENT
Start: 2025-02-24 | End: 2025-03-26

## 2025-03-10 RX ORDER — ERGOCALCIFEROL 1.25 MG/1
50000 CAPSULE, LIQUID FILLED ORAL WEEKLY
Qty: 12 CAPSULE | Refills: 2 | Status: SHIPPED | OUTPATIENT
Start: 2025-03-10

## 2025-03-10 RX ORDER — HYDROCHLOROTHIAZIDE 12.5 MG/1
12.5 TABLET ORAL DAILY PRN
Qty: 30 TABLET | Refills: 2 | Status: SHIPPED | OUTPATIENT
Start: 2025-03-10

## 2025-03-20 DIAGNOSIS — F41.9 ANXIETY: ICD-10-CM

## 2025-03-20 RX ORDER — DIAZEPAM 2 MG/1
2 TABLET ORAL NIGHTLY PRN
Qty: 30 TABLET | Refills: 1 | Status: SHIPPED | OUTPATIENT
Start: 2025-03-20 | End: 2025-05-19

## 2025-03-24 RX ORDER — BUSPIRONE HYDROCHLORIDE 30 MG/1
TABLET ORAL
Qty: 60 TABLET | Refills: 4 | Status: SHIPPED | OUTPATIENT
Start: 2025-03-24

## 2025-03-25 DIAGNOSIS — G89.29 CHRONIC MIDLINE LOW BACK PAIN WITHOUT SCIATICA: ICD-10-CM

## 2025-03-25 DIAGNOSIS — M54.50 CHRONIC MIDLINE LOW BACK PAIN WITHOUT SCIATICA: ICD-10-CM

## 2025-03-25 RX ORDER — GABAPENTIN 300 MG/1
CAPSULE ORAL
Qty: 90 CAPSULE | Refills: 2 | OUTPATIENT
Start: 2025-03-25 | End: 2025-04-24

## 2025-04-23 DIAGNOSIS — G89.29 CHRONIC MIDLINE LOW BACK PAIN WITHOUT SCIATICA: ICD-10-CM

## 2025-04-23 DIAGNOSIS — M54.50 CHRONIC MIDLINE LOW BACK PAIN WITHOUT SCIATICA: ICD-10-CM

## 2025-04-23 RX ORDER — GABAPENTIN 300 MG/1
CAPSULE ORAL
Qty: 90 CAPSULE | Refills: 1 | Status: SHIPPED | OUTPATIENT
Start: 2025-04-23 | End: 2025-05-23

## 2025-05-02 RX ORDER — TIZANIDINE 2 MG/1
2 TABLET ORAL 3 TIMES DAILY PRN
Qty: 45 TABLET | Refills: 2 | Status: SHIPPED | OUTPATIENT
Start: 2025-05-02

## 2025-05-05 ENCOUNTER — HOSPITAL ENCOUNTER (OUTPATIENT)
Facility: HOSPITAL | Age: 70
Discharge: HOME | End: 2025-05-05
Payer: MEDICARE

## 2025-05-05 DIAGNOSIS — E03.9 HYPOTHYROIDISM, UNSPECIFIED TYPE: ICD-10-CM

## 2025-05-05 DIAGNOSIS — E78.5 HYPERLIPIDEMIA, UNSPECIFIED HYPERLIPIDEMIA TYPE: ICD-10-CM

## 2025-05-05 DIAGNOSIS — I10 BENIGN HYPERTENSION: ICD-10-CM

## 2025-05-05 LAB
ALBUMIN SERPL-MCNC: 4.3 G/DL (ref 3.4–4.8)
ALBUMIN/GLOB SERPL: 1.5 {RATIO} (ref 0.8–2)
ALP SERPL-CCNC: 70 U/L (ref 25–100)
ALT SERPL-CCNC: 29 U/L (ref 4–36)
ANION GAP SERPL CALCULATED.3IONS-SCNC: 11 MMOL/L (ref 3–16)
AST SERPL-CCNC: 30 U/L (ref 8–33)
BASOPHILS # BLD: 0.1 K/UL (ref 0–0.1)
BASOPHILS NFR BLD: 1 %
BILIRUB SERPL-MCNC: <0.2 MG/DL (ref 0.3–1.2)
BUN SERPL-MCNC: 12 MG/DL (ref 6–20)
CALCIUM SERPL-MCNC: 9.9 MG/DL (ref 8.3–10.6)
CHLORIDE SERPL-SCNC: 103 MMOL/L (ref 98–107)
CHOLEST SERPL-MCNC: 143 MG/DL (ref 0–200)
CO2 SERPL-SCNC: 29 MMOL/L (ref 20–30)
CREAT SERPL-MCNC: 0.9 MG/DL (ref 0.4–1.2)
EOSINOPHIL # BLD: 0.2 K/UL (ref 0–0.4)
EOSINOPHIL NFR BLD: 2.7 %
ERYTHROCYTE [DISTWIDTH] IN BLOOD BY AUTOMATED COUNT: 14.6 % (ref 11–16)
GFR SERPLBLD CREATININE-BSD FMLA CKD-EPI: 69 ML/MIN/{1.73_M2}
GLOBULIN SER CALC-MCNC: 2.9 G/DL
GLUCOSE SERPL-MCNC: 95 MG/DL (ref 74–106)
HCT VFR BLD AUTO: 45.2 % (ref 37–47)
HDLC SERPL-MCNC: 47 MG/DL (ref 40–60)
HGB BLD-MCNC: 14.6 G/DL (ref 11.5–16.5)
IMM GRANULOCYTES # BLD: 0 K/UL
IMM GRANULOCYTES NFR BLD: 0.2 % (ref 0–5)
LDLC SERPL CALC-MCNC: 40 MG/DL
LYMPHOCYTES # BLD: 2.6 K/UL (ref 1.5–4)
LYMPHOCYTES NFR BLD: 30.5 %
MAGNESIUM SERPL-MCNC: 2.3 MG/DL (ref 1.7–2.4)
MCH RBC QN AUTO: 28.3 PG (ref 27–32)
MCHC RBC AUTO-ENTMCNC: 32.3 G/DL (ref 31–35)
MCV RBC AUTO: 87.6 FL (ref 80–100)
MONOCYTES # BLD: 0.9 K/UL (ref 0.2–0.8)
MONOCYTES NFR BLD: 10.2 %
NEUTROPHILS # BLD: 4.6 K/UL (ref 2–7.5)
NEUTS SEG NFR BLD: 55.4 %
PLATELET # BLD AUTO: 199 K/UL (ref 150–400)
PMV BLD AUTO: 10 FL (ref 6–10)
POTASSIUM SERPL-SCNC: 4 MMOL/L (ref 3.4–5.1)
PROT SERPL-MCNC: 7.2 G/DL (ref 6.4–8.3)
RBC # BLD AUTO: 5.16 M/UL (ref 3.8–5.8)
SODIUM SERPL-SCNC: 143 MMOL/L (ref 136–145)
TRIGL SERPL-MCNC: 279 MG/DL (ref 0–249)
TSH SERPL DL<=0.005 MIU/L-ACNC: 2.43 UIU/ML (ref 0.27–4.2)
VLDLC SERPL CALC-MCNC: 56 MG/DL
WBC # BLD AUTO: 8.4 K/UL (ref 4–11)

## 2025-05-05 PROCEDURE — 80061 LIPID PANEL: CPT

## 2025-05-05 PROCEDURE — 83735 ASSAY OF MAGNESIUM: CPT

## 2025-05-05 PROCEDURE — 80053 COMPREHEN METABOLIC PANEL: CPT

## 2025-05-05 PROCEDURE — 85025 COMPLETE CBC W/AUTO DIFF WBC: CPT

## 2025-05-05 PROCEDURE — 84443 ASSAY THYROID STIM HORMONE: CPT

## 2025-05-05 PROCEDURE — 36415 COLL VENOUS BLD VENIPUNCTURE: CPT

## 2025-05-12 RX ORDER — HYDROXYZINE PAMOATE 50 MG/1
50 CAPSULE ORAL NIGHTLY PRN
Qty: 30 CAPSULE | Refills: 4 | Status: SHIPPED | OUTPATIENT
Start: 2025-05-12

## 2025-05-12 RX ORDER — ATENOLOL 100 MG/1
100 TABLET ORAL DAILY
Qty: 30 TABLET | Refills: 3 | Status: SHIPPED | OUTPATIENT
Start: 2025-05-12

## 2025-05-12 RX ORDER — ROSUVASTATIN CALCIUM 40 MG/1
40 TABLET, COATED ORAL DAILY
Qty: 90 TABLET | Refills: 1 | Status: SHIPPED | OUTPATIENT
Start: 2025-05-12

## 2025-05-21 DIAGNOSIS — M54.50 CHRONIC MIDLINE LOW BACK PAIN WITHOUT SCIATICA: ICD-10-CM

## 2025-05-21 DIAGNOSIS — G89.29 CHRONIC MIDLINE LOW BACK PAIN WITHOUT SCIATICA: ICD-10-CM

## 2025-05-21 RX ORDER — GABAPENTIN 300 MG/1
CAPSULE ORAL
Qty: 90 CAPSULE | Refills: 2 | OUTPATIENT
Start: 2025-05-21 | End: 2025-06-20

## 2025-05-22 ENCOUNTER — OFFICE VISIT (OUTPATIENT)
Age: 70
End: 2025-05-22
Payer: MEDICARE

## 2025-05-22 VITALS
BODY MASS INDEX: 19.2 KG/M2 | HEART RATE: 56 BPM | SYSTOLIC BLOOD PRESSURE: 176 MMHG | OXYGEN SATURATION: 95 % | WEIGHT: 130 LBS | RESPIRATION RATE: 18 BRPM | DIASTOLIC BLOOD PRESSURE: 69 MMHG

## 2025-05-22 DIAGNOSIS — Z87.891 PERSONAL HISTORY OF TOBACCO USE, PRESENTING HAZARDS TO HEALTH: ICD-10-CM

## 2025-05-22 DIAGNOSIS — E03.9 HYPOTHYROIDISM, UNSPECIFIED TYPE: ICD-10-CM

## 2025-05-22 DIAGNOSIS — F41.9 ANXIETY: ICD-10-CM

## 2025-05-22 DIAGNOSIS — E78.5 HYPERLIPIDEMIA, UNSPECIFIED HYPERLIPIDEMIA TYPE: ICD-10-CM

## 2025-05-22 DIAGNOSIS — I10 BENIGN HYPERTENSION: Primary | ICD-10-CM

## 2025-05-22 PROCEDURE — 1123F ACP DISCUSS/DSCN MKR DOCD: CPT | Performed by: INTERNAL MEDICINE

## 2025-05-22 PROCEDURE — 99214 OFFICE O/P EST MOD 30 MIN: CPT | Performed by: INTERNAL MEDICINE

## 2025-05-22 PROCEDURE — 1160F RVW MEDS BY RX/DR IN RCRD: CPT | Performed by: INTERNAL MEDICINE

## 2025-05-22 PROCEDURE — 3078F DIAST BP <80 MM HG: CPT | Performed by: INTERNAL MEDICINE

## 2025-05-22 PROCEDURE — 3017F COLORECTAL CA SCREEN DOC REV: CPT | Performed by: INTERNAL MEDICINE

## 2025-05-22 PROCEDURE — 99406 BEHAV CHNG SMOKING 3-10 MIN: CPT | Performed by: INTERNAL MEDICINE

## 2025-05-22 PROCEDURE — G8420 CALC BMI NORM PARAMETERS: HCPCS | Performed by: INTERNAL MEDICINE

## 2025-05-22 PROCEDURE — 3077F SYST BP >= 140 MM HG: CPT | Performed by: INTERNAL MEDICINE

## 2025-05-22 PROCEDURE — G8427 DOCREV CUR MEDS BY ELIG CLIN: HCPCS | Performed by: INTERNAL MEDICINE

## 2025-05-22 PROCEDURE — 4004F PT TOBACCO SCREEN RCVD TLK: CPT | Performed by: INTERNAL MEDICINE

## 2025-05-22 PROCEDURE — 1090F PRES/ABSN URINE INCON ASSESS: CPT | Performed by: INTERNAL MEDICINE

## 2025-05-22 PROCEDURE — 1159F MED LIST DOCD IN RCRD: CPT | Performed by: INTERNAL MEDICINE

## 2025-05-22 PROCEDURE — G8399 PT W/DXA RESULTS DOCUMENT: HCPCS | Performed by: INTERNAL MEDICINE

## 2025-05-22 RX ORDER — CYCLOSPORINE 0.5 MG/ML
EMULSION OPHTHALMIC
COMMUNITY
Start: 2025-05-05

## 2025-05-22 RX ORDER — LISINOPRIL 5 MG/1
5 TABLET ORAL DAILY
Qty: 30 TABLET | Refills: 1 | Status: SHIPPED | OUTPATIENT
Start: 2025-05-22

## 2025-05-22 SDOH — ECONOMIC STABILITY: FOOD INSECURITY: WITHIN THE PAST 12 MONTHS, YOU WORRIED THAT YOUR FOOD WOULD RUN OUT BEFORE YOU GOT MONEY TO BUY MORE.: NEVER TRUE

## 2025-05-22 SDOH — ECONOMIC STABILITY: FOOD INSECURITY: WITHIN THE PAST 12 MONTHS, THE FOOD YOU BOUGHT JUST DIDN'T LAST AND YOU DIDN'T HAVE MONEY TO GET MORE.: NEVER TRUE

## 2025-05-22 ASSESSMENT — ENCOUNTER SYMPTOMS
NAUSEA: 0
COUGH: 0
ABDOMINAL PAIN: 0
SINUS PRESSURE: 0
SORE THROAT: 0
VOMITING: 0
SHORTNESS OF BREATH: 0
EYE DISCHARGE: 0
BACK PAIN: 1
WHEEZING: 0

## 2025-05-22 ASSESSMENT — PATIENT HEALTH QUESTIONNAIRE - PHQ9
SUM OF ALL RESPONSES TO PHQ QUESTIONS 1-9: 0
1. LITTLE INTEREST OR PLEASURE IN DOING THINGS: NOT AT ALL
SUM OF ALL RESPONSES TO PHQ QUESTIONS 1-9: 0
2. FEELING DOWN, DEPRESSED OR HOPELESS: NOT AT ALL
SUM OF ALL RESPONSES TO PHQ QUESTIONS 1-9: 0
SUM OF ALL RESPONSES TO PHQ QUESTIONS 1-9: 0

## 2025-05-22 NOTE — PROGRESS NOTES
SUBJECTIVE:    Patient ID: Shirley Saravia is a 70 y.o.female.    Chief Complaint   Patient presents with    Hypertension    Hyperlipidemia    Insomnia    Anxiety         HPI:  Patient has had hypertension and hyperlipidemia for  several years. She has been compliant with taking medications, without side effects from it. She has been following appropriate diet.  She is not active and never exercises.  Weight is down 6 pounds, compared to last visit. Her blood pressure is significantly elevated at this time which is unusual for her.    She has had a nerve problem for long time. Her sx have been stable. She occasionally has some difficulties falling and maintaining sleep. She denies any suicidal ideation. She has been compliant with taking medication without side effects. She has supportive family.    Patient's medications, allergies, past medical, surgical, social and family histories were reviewed and updated as appropriate in the electronic medical record/chart.        Outpatient Medications Marked as Taking for the 5/22/25 encounter (Office Visit) with Fco Waldron MD   Medication Sig Dispense Refill    cycloSPORINE (RESTASIS) 0.05 % ophthalmic emulsion       hydrOXYzine pamoate (VISTARIL) 50 MG capsule TAKE 1 CAPSULE BY MOUTH NIGHTLY AS NEEDED FOR ITCHING 30 capsule 4    atenolol (TENORMIN) 100 MG tablet TAKE 1 TABLET BY MOUTH DAILY 30 tablet 3    rosuvastatin (CRESTOR) 40 MG tablet TAKE 1 TABLET BY MOUTH DAILY 90 tablet 1    tiZANidine (ZANAFLEX) 2 MG tablet TAKE 1 TABLET BY MOUTH 3 TIMES DAILY AS NEEDED (PAIN AND SPASMS) 45 tablet 2    gabapentin (NEURONTIN) 300 MG capsule TAKE ONE CAPSULE BY MOUTH 3 TIMES DAILY 90 capsule 1    busPIRone (BUSPAR) 30 MG tablet TAKE ONE TABLET BY MOUTH EVERY MORNING AND 1 TABLET AT BEDTIME 60 tablet 4    hydroCHLOROthiazide 12.5 MG tablet TAKE 1 TABLET BY MOUTH DAILY AS NEEDED (SWELLING) 30 tablet 2    vitamin D (ERGOCALCIFEROL) 1.25 MG (15486 UT) CAPS capsule TAKE 1 CAPSULE BY

## 2025-05-22 NOTE — PATIENT INSTRUCTIONS
the holes in the filters. All of these actions will increase your nicotine intake, and the idea is to get your body used to functioning without nicotine.   Cut Down the Number of Cigarettes You Smoke   Smoke only half of each cigarette.   Each day, postpone the lighting of your first cigarette by one hour.   Decide you'll only smoke during odd or even hours of the day.   Decide beforehand how many cigarettes you'll smoke during the day. For each additional cigarette, give a dollar to your favorite daniel.   Change your eating habits to help you cut down. For example, drink milk, which many people consider incompatible with smoking. End meals or snacks with something that won't lead to a cigarette.   Reach for a glass of juice instead of a cigarette for a \"pick-me-up.\"   Remember: Cutting down can help you quit, but it's not a substitute for quitting. If you're down to about seven cigarettes a day, it's time to set your target quit date, and get ready to stick to it.   Don't Smoke \"Automatically\"   Smoke only those cigarettes you really want. Catch yourself before you light up a cigarette out of pure habit.   Don't empty your ashtrays. This will remind you of how many cigarettes you've smoked each day, and the sight and the smell of stale cigarettes butts will be very unpleasant.   Make yourself aware of each cigarette by using the opposite hand or putting cigarettes in an unfamiliar location or a different pocket to break the automatic reach.   If you light up many times during the day without even thinking about it, try to look in a mirror each time you put a match to your cigarette. You may decide you don't need it.   Make Smoking Inconvenient   Stop buying cigarettes by the carton. Wait until one pack is empty before you buy another.   Stop carrying cigarettes with you at home or at work. Make them difficult to get to.   Make Smoking Unpleasant   Smoke only under circumstances that aren't especially pleasurable

## 2025-06-04 DIAGNOSIS — F41.9 ANXIETY: ICD-10-CM

## 2025-06-04 RX ORDER — DIAZEPAM 2 MG/1
2 TABLET ORAL NIGHTLY PRN
Qty: 30 TABLET | Refills: 1 | Status: SHIPPED | OUTPATIENT
Start: 2025-06-04 | End: 2025-08-03

## 2025-06-11 RX ORDER — CLOPIDOGREL BISULFATE 75 MG/1
75 TABLET ORAL DAILY
Qty: 90 TABLET | Refills: 1 | Status: SHIPPED | OUTPATIENT
Start: 2025-06-11

## 2025-06-18 RX ORDER — LISINOPRIL 5 MG/1
5 TABLET ORAL DAILY
Qty: 30 TABLET | Refills: 2 | Status: SHIPPED | OUTPATIENT
Start: 2025-06-18

## 2025-06-24 DIAGNOSIS — M54.50 CHRONIC MIDLINE LOW BACK PAIN WITHOUT SCIATICA: ICD-10-CM

## 2025-06-24 DIAGNOSIS — G89.29 CHRONIC MIDLINE LOW BACK PAIN WITHOUT SCIATICA: ICD-10-CM

## 2025-06-24 RX ORDER — GABAPENTIN 300 MG/1
CAPSULE ORAL
Qty: 90 CAPSULE | Refills: 1 | Status: SHIPPED | OUTPATIENT
Start: 2025-06-24 | End: 2025-07-24

## 2025-07-28 RX ORDER — LEVOTHYROXINE SODIUM 50 UG/1
50 TABLET ORAL DAILY
Qty: 90 TABLET | Refills: 2 | Status: SHIPPED | OUTPATIENT
Start: 2025-07-28

## 2025-08-11 DIAGNOSIS — F41.9 ANXIETY: ICD-10-CM

## 2025-08-11 RX ORDER — DIAZEPAM 2 MG/1
2 TABLET ORAL NIGHTLY PRN
Qty: 30 TABLET | Refills: 1 | Status: SHIPPED | OUTPATIENT
Start: 2025-08-11 | End: 2025-10-10

## 2025-08-18 RX ORDER — BUSPIRONE HYDROCHLORIDE 30 MG/1
TABLET ORAL
Qty: 60 TABLET | Refills: 5 | Status: SHIPPED | OUTPATIENT
Start: 2025-08-18

## 2025-08-21 ENCOUNTER — OFFICE VISIT (OUTPATIENT)
Dept: CARDIOLOGY | Facility: CLINIC | Age: 70
End: 2025-08-21
Payer: MEDICARE

## 2025-08-21 VITALS
HEART RATE: 66 BPM | WEIGHT: 128.5 LBS | SYSTOLIC BLOOD PRESSURE: 120 MMHG | DIASTOLIC BLOOD PRESSURE: 60 MMHG | BODY MASS INDEX: 20.17 KG/M2 | OXYGEN SATURATION: 96 % | HEIGHT: 67 IN

## 2025-08-21 DIAGNOSIS — G89.29 CHRONIC MIDLINE LOW BACK PAIN WITHOUT SCIATICA: ICD-10-CM

## 2025-08-21 DIAGNOSIS — M54.50 CHRONIC MIDLINE LOW BACK PAIN WITHOUT SCIATICA: ICD-10-CM

## 2025-08-21 DIAGNOSIS — I65.23 BILATERAL CAROTID ARTERY STENOSIS: ICD-10-CM

## 2025-08-21 DIAGNOSIS — E78.2 MIXED HYPERLIPIDEMIA: ICD-10-CM

## 2025-08-21 DIAGNOSIS — I10 BENIGN HYPERTENSION: Primary | ICD-10-CM

## 2025-08-21 DIAGNOSIS — I73.9 PVD (PERIPHERAL VASCULAR DISEASE) WITH CLAUDICATION: ICD-10-CM

## 2025-08-21 RX ORDER — RIVAROXABAN 2.5 MG/1
2.5 TABLET, FILM COATED ORAL 2 TIMES DAILY
Qty: 60 TABLET | Refills: 5 | Status: SHIPPED | OUTPATIENT
Start: 2025-08-21

## 2025-08-21 RX ORDER — LISINOPRIL 5 MG/1
5 TABLET ORAL DAILY
COMMUNITY

## 2025-08-22 RX ORDER — TIZANIDINE 2 MG/1
2 TABLET ORAL 3 TIMES DAILY PRN
Qty: 45 TABLET | Refills: 3 | Status: SHIPPED | OUTPATIENT
Start: 2025-08-22

## 2025-08-23 RX ORDER — GABAPENTIN 300 MG/1
CAPSULE ORAL
Qty: 90 CAPSULE | Refills: 1 | Status: SHIPPED | OUTPATIENT
Start: 2025-08-23 | End: 2025-09-22

## 2025-08-29 RX ORDER — ATENOLOL 100 MG/1
100 TABLET ORAL DAILY
Qty: 30 TABLET | Refills: 4 | Status: SHIPPED | OUTPATIENT
Start: 2025-08-29